# Patient Record
Sex: FEMALE | Race: WHITE | Employment: OTHER | URBAN - NONMETROPOLITAN AREA
[De-identification: names, ages, dates, MRNs, and addresses within clinical notes are randomized per-mention and may not be internally consistent; named-entity substitution may affect disease eponyms.]

---

## 2019-04-29 ENCOUNTER — HOSPITAL ENCOUNTER (EMERGENCY)
Age: 69
Discharge: HOME | End: 2019-04-29
Attending: FAMILY MEDICINE
Payer: COMMERCIAL

## 2019-04-29 VITALS
WEIGHT: 180 LBS | OXYGEN SATURATION: 98 % | HEIGHT: 66 IN | DIASTOLIC BLOOD PRESSURE: 73 MMHG | TEMPERATURE: 98.6 F | SYSTOLIC BLOOD PRESSURE: 157 MMHG | RESPIRATION RATE: 18 BRPM | BODY MASS INDEX: 28.93 KG/M2 | HEART RATE: 81 BPM

## 2019-04-29 DIAGNOSIS — R11.2 NAUSEA VOMITING AND DIARRHEA: Primary | ICD-10-CM

## 2019-04-29 DIAGNOSIS — R19.7 NAUSEA VOMITING AND DIARRHEA: Primary | ICD-10-CM

## 2019-04-29 DIAGNOSIS — I10 ESSENTIAL HYPERTENSION: ICD-10-CM

## 2019-04-29 DIAGNOSIS — E87.6 HYPOKALEMIA: ICD-10-CM

## 2019-04-29 LAB
ANION GAP: 13 MEQ/L (ref 8–16)
BASOPHILS # BLD: 0.4 % (ref 0–3)
BUN BLDV-MCNC: 12 MG/DL (ref 7–18)
CHLORIDE BLD-SCNC: 98 MEQ/L (ref 98–107)
CO2: 23 MEQ/L (ref 21–32)
CREAT SERPL-MCNC: 0.7 MG/DL (ref 0.6–1.3)
EOSINOPHILS RELATIVE PERCENT: 1.4 % (ref 0–4)
GFR, ESTIMATED: 88 ML/MIN/1.73M2
GLUCOSE BLD-MCNC: 108 MG/DL (ref 74–106)
HCT VFR BLD CALC: 46.7 % (ref 37–47)
HEMOGLOBIN: 15.7 GM/DL (ref 12–16)
LYMPHOCYTES # BLD: 25 % (ref 15–47)
MCH RBC QN AUTO: 31.2 PG (ref 27–31)
MCHC RBC AUTO-ENTMCNC: 33.6 GM/DL (ref 33–37)
MCV RBC AUTO: 92.7 FL (ref 81–99)
MONOCYTES: 9.6 % (ref 0–12)
PDW BLD-RTO: 12.4 % (ref 11.5–14.5)
PLATELET # BLD: 305 THOU/MM3 (ref 130–400)
PMV BLD AUTO: 8.3 FL (ref 7.4–10.4)
POC CALCIUM: 10.3 MG/DL (ref 8.5–10.1)
POTASSIUM SERPL-SCNC: 3.3 MEQ/L (ref 3.5–5.1)
RBC # BLD: 5.04 MILL/MM3 (ref 4.2–5.4)
SEGS: 63.6 % (ref 43–75)
SODIUM BLD-SCNC: 134 MEQ/L (ref 136–145)
WBC # BLD: 6.9 THOU/MM3 (ref 4.8–10.8)

## 2019-04-29 PROCEDURE — 85025 COMPLETE CBC W/AUTO DIFF WBC: CPT

## 2019-04-29 PROCEDURE — 6360000002 HC RX W HCPCS: Performed by: FAMILY MEDICINE

## 2019-04-29 PROCEDURE — 2709999900 HC NON-CHARGEABLE SUPPLY

## 2019-04-29 PROCEDURE — 6370000000 HC RX 637 (ALT 250 FOR IP): Performed by: FAMILY MEDICINE

## 2019-04-29 PROCEDURE — 36415 COLL VENOUS BLD VENIPUNCTURE: CPT

## 2019-04-29 PROCEDURE — 96374 THER/PROPH/DIAG INJ IV PUSH: CPT

## 2019-04-29 PROCEDURE — 2580000003 HC RX 258: Performed by: FAMILY MEDICINE

## 2019-04-29 PROCEDURE — 80048 BASIC METABOLIC PNL TOTAL CA: CPT

## 2019-04-29 PROCEDURE — 99284 EMERGENCY DEPT VISIT MOD MDM: CPT

## 2019-04-29 RX ORDER — AMLODIPINE BESYLATE 10 MG/1
10 TABLET ORAL DAILY
COMMUNITY
End: 2022-08-01 | Stop reason: SDUPTHER

## 2019-04-29 RX ORDER — POTASSIUM CHLORIDE 750 MG/1
40 TABLET, FILM COATED, EXTENDED RELEASE ORAL ONCE
Status: COMPLETED | OUTPATIENT
Start: 2019-04-29 | End: 2019-04-29

## 2019-04-29 RX ORDER — 0.9 % SODIUM CHLORIDE 0.9 %
1000 INTRAVENOUS SOLUTION INTRAVENOUS ONCE
Status: COMPLETED | OUTPATIENT
Start: 2019-04-29 | End: 2019-04-29

## 2019-04-29 RX ORDER — LISINOPRIL 40 MG/1
40 TABLET ORAL DAILY
COMMUNITY
End: 2022-08-01

## 2019-04-29 RX ORDER — ONDANSETRON 2 MG/ML
4 INJECTION INTRAMUSCULAR; INTRAVENOUS ONCE
Status: COMPLETED | OUTPATIENT
Start: 2019-04-29 | End: 2019-04-29

## 2019-04-29 RX ADMIN — ONDANSETRON 4 MG: 2 INJECTION INTRAMUSCULAR; INTRAVENOUS at 10:00

## 2019-04-29 RX ADMIN — SODIUM CHLORIDE 1000 ML: 9 INJECTION, SOLUTION INTRAVENOUS at 10:00

## 2019-04-29 RX ADMIN — POTASSIUM CHLORIDE 40 MEQ: 750 TABLET, EXTENDED RELEASE ORAL at 11:02

## 2019-04-29 ASSESSMENT — ENCOUNTER SYMPTOMS
COUGH: 1
ABDOMINAL PAIN: 0
EYE DISCHARGE: 0
NAUSEA: 1
EYE REDNESS: 0
RHINORRHEA: 0
SHORTNESS OF BREATH: 0
DIARRHEA: 1
VOMITING: 0

## 2019-04-29 NOTE — ED NOTES
Discharge instructions reviewed with patient and questions answered. Skin warm and dry, color normal for ethnicity. No emesis or diarrhea while at [facility]. Remains alert and cooperative. Denies further questions or concerns at this time.      Stanley Fox RN  04/29/19 1790

## 2019-04-29 NOTE — ED PROVIDER NOTES
antibiotics. FAMILY HISTORY     has no family status information on file. family history is not on file. SOCIAL HISTORY      reports that she has never smoked. She has never used smokeless tobacco.    PHYSICAL EXAM     INITIAL VITALS:  height is 5' 6\" (1.676 m) and weight is 180 lb (81.6 kg). Her oral temperature is 98.6 °F (37 °C). Her blood pressure is 157/73 (abnormal) and her pulse is 81. Her respiration is 18 and oxygen saturation is 98%. Physical Exam   Constitutional: She is oriented to person, place, and time. She appears well-developed and well-nourished. No distress. HENT:   Head: Normocephalic and atraumatic. Dry oral mucosa   Eyes: Pupils are equal, round, and reactive to light. Conjunctivae and EOM are normal. Right eye exhibits no discharge. Left eye exhibits no discharge. Cardiovascular: Normal rate, regular rhythm, normal heart sounds and intact distal pulses. No murmur heard. Pulmonary/Chest: Effort normal and breath sounds normal. She has no wheezes. She exhibits no tenderness. Abdominal: Soft. She exhibits no distension. There is no tenderness. There is no guarding. Increased bowel sounds   Musculoskeletal: She exhibits no edema. Neurological: She is alert and oriented to person, place, and time. No cranial nerve deficit. Skin: Skin is warm and dry. No rash noted. Psychiatric: She has a normal mood and affect. Her behavior is normal.   Nursing note and vitals reviewed. DIFFERENTIAL DIAGNOSIS:   Gastroenteritis, C.  Diff    DIAGNOSTIC RESULTS       LABS:   Labs Reviewed   CBC WITH AUTO DIFFERENTIAL - Abnormal; Notable for the following components:       Result Value    MCH 31.2 (*)     All other components within normal limits   BASIC METABOLIC PANEL - Abnormal; Notable for the following components:    Sodium 134 (*)     Potassium 3.3 (*)     Glucose 108 (*)     POC CALCIUM 10.3 (*)     All other components within normal limits   GLOMERULAR FILTRATION RATE, ESTIMATED - Abnormal; Notable for the following components:    GFR, Estimated 88 (*)     All other components within normal limits   ANION GAP       EMERGENCY DEPARTMENT COURSE:   Vitals:    Vitals:    04/29/19 0915 04/29/19 1104   BP: (!) 156/80 (!) 157/73   Pulse: 89 81   Resp: 20 18   Temp: 98.6 °F (37 °C)    TempSrc: Oral    SpO2: 97% 98%   Weight: 180 lb (81.6 kg)    Height: 5' 6\" (1.676 m)      MDM  Patient seen and evaluated in the department for nausea and diarrhea. Appropriate labs were ordered and reviewed. She was unable to produce a stool sample for testing. Patient was treated in the department with Zofran, Klor-Con, and 0.9% NS IVF bolus. Patient's blood pressure was noted to be elevated within the department. Patient has diagnosis of hypertension and did not take her antihypertensive medications today. I considered discharge to be an appropriate decision based on the patient's condition. Patient was discharged home in stable condition with recommended follow up with their PCP. CRITICAL CARE:   None    CONSULTS:  None    PROCEDURES:  None    FINAL IMPRESSION      1. Nausea vomiting and diarrhea    2. Hypokalemia    3.  Essential hypertension          DISPOSITION/PLAN   Discharge    PATIENT REFERRED TO:  Jessica Hylton MD  [address] [place] [address]  [phone]    Schedule an appointment as soon as possible for a visit in 3 days  diarrhea and hypertension      DISCHARGEMEDICATIONS:  New Prescriptions    No medications on file       (Please note that portions of this note were completedwith a voice recognition program.  Efforts were made to edit the dictations but occasionally words are mis-transcribed.)    MD Jesica Morrissey MD  04/29/19 1119

## 2019-10-23 ENCOUNTER — HOSPITAL ENCOUNTER (OUTPATIENT)
Dept: PHYSICAL THERAPY | Age: 69
Setting detail: THERAPIES SERIES
Discharge: HOME | End: 2019-10-23
Payer: COMMERCIAL

## 2019-10-23 PROCEDURE — 97035 APP MDLTY 1+ULTRASOUND EA 15: CPT

## 2019-10-23 PROCEDURE — 97161 PT EVAL LOW COMPLEX 20 MIN: CPT

## 2019-10-23 ASSESSMENT — PAIN DESCRIPTION - LOCATION: LOCATION: KNEE

## 2019-10-23 ASSESSMENT — PAIN DESCRIPTION - ORIENTATION: ORIENTATION: RIGHT;LEFT

## 2019-10-23 ASSESSMENT — PAIN SCALES - GENERAL: PAINLEVEL_OUTOF10: 0

## 2019-10-23 ASSESSMENT — PAIN DESCRIPTION - PAIN TYPE: TYPE: CHRONIC PAIN

## 2019-10-25 ENCOUNTER — HOSPITAL ENCOUNTER (OUTPATIENT)
Dept: PHYSICAL THERAPY | Age: 69
Setting detail: THERAPIES SERIES
Discharge: HOME | End: 2019-10-25
Payer: COMMERCIAL

## 2019-10-25 PROCEDURE — 97035 APP MDLTY 1+ULTRASOUND EA 15: CPT

## 2019-10-25 PROCEDURE — 97530 THERAPEUTIC ACTIVITIES: CPT

## 2019-10-25 ASSESSMENT — PAIN SCALES - GENERAL: PAINLEVEL_OUTOF10: 1

## 2019-10-25 ASSESSMENT — PAIN DESCRIPTION - ORIENTATION: ORIENTATION: LEFT

## 2019-10-25 ASSESSMENT — PAIN DESCRIPTION - LOCATION: LOCATION: LEG

## 2019-10-25 ASSESSMENT — PAIN DESCRIPTION - PAIN TYPE: TYPE: CHRONIC PAIN

## 2019-10-29 ENCOUNTER — HOSPITAL ENCOUNTER (OUTPATIENT)
Dept: PHYSICAL THERAPY | Age: 69
Setting detail: THERAPIES SERIES
Discharge: HOME | End: 2019-10-29
Payer: COMMERCIAL

## 2019-10-29 PROCEDURE — 97035 APP MDLTY 1+ULTRASOUND EA 15: CPT

## 2019-10-29 PROCEDURE — 97110 THERAPEUTIC EXERCISES: CPT

## 2019-10-29 ASSESSMENT — PAIN SCALES - GENERAL: PAINLEVEL_OUTOF10: 4

## 2019-11-01 ENCOUNTER — HOSPITAL ENCOUNTER (OUTPATIENT)
Dept: PHYSICAL THERAPY | Age: 69
Setting detail: THERAPIES SERIES
Discharge: HOME | End: 2019-11-01
Payer: COMMERCIAL

## 2019-11-01 PROCEDURE — 97530 THERAPEUTIC ACTIVITIES: CPT

## 2019-11-01 PROCEDURE — 97035 APP MDLTY 1+ULTRASOUND EA 15: CPT

## 2019-11-01 ASSESSMENT — PAIN DESCRIPTION - LOCATION: LOCATION: KNEE

## 2019-11-01 ASSESSMENT — PAIN DESCRIPTION - ORIENTATION: ORIENTATION: RIGHT;LEFT

## 2019-11-01 ASSESSMENT — PAIN SCALES - GENERAL: PAINLEVEL_OUTOF10: 2

## 2019-11-06 ENCOUNTER — HOSPITAL ENCOUNTER (OUTPATIENT)
Dept: PHYSICAL THERAPY | Age: 69
Setting detail: THERAPIES SERIES
Discharge: HOME | End: 2019-11-06
Payer: COMMERCIAL

## 2019-11-06 PROCEDURE — 97035 APP MDLTY 1+ULTRASOUND EA 15: CPT

## 2019-11-06 PROCEDURE — 97530 THERAPEUTIC ACTIVITIES: CPT

## 2019-11-06 ASSESSMENT — PAIN DESCRIPTION - LOCATION: LOCATION: KNEE

## 2019-11-06 ASSESSMENT — PAIN SCALES - GENERAL: PAINLEVEL_OUTOF10: 1

## 2019-11-06 ASSESSMENT — PAIN DESCRIPTION - ORIENTATION: ORIENTATION: LEFT

## 2019-11-06 ASSESSMENT — PAIN DESCRIPTION - PAIN TYPE: TYPE: CHRONIC PAIN

## 2019-11-08 ENCOUNTER — HOSPITAL ENCOUNTER (OUTPATIENT)
Dept: PHYSICAL THERAPY | Age: 69
Setting detail: THERAPIES SERIES
Discharge: HOME | End: 2019-11-08
Payer: COMMERCIAL

## 2019-11-08 PROCEDURE — 97530 THERAPEUTIC ACTIVITIES: CPT

## 2019-11-08 PROCEDURE — 97035 APP MDLTY 1+ULTRASOUND EA 15: CPT

## 2019-11-08 ASSESSMENT — PAIN DESCRIPTION - ORIENTATION: ORIENTATION: LEFT

## 2019-11-08 ASSESSMENT — PAIN DESCRIPTION - DIRECTION: RADIATING_TOWARDS: POSTERIOR L KNEE

## 2019-11-08 ASSESSMENT — PAIN DESCRIPTION - PAIN TYPE: TYPE: CHRONIC PAIN

## 2019-11-08 ASSESSMENT — PAIN SCALES - GENERAL: PAINLEVEL_OUTOF10: 1

## 2019-11-08 ASSESSMENT — PAIN DESCRIPTION - LOCATION: LOCATION: KNEE

## 2019-11-13 ENCOUNTER — HOSPITAL ENCOUNTER (OUTPATIENT)
Dept: PHYSICAL THERAPY | Age: 69
Setting detail: THERAPIES SERIES
Discharge: HOME | End: 2019-11-13
Payer: COMMERCIAL

## 2019-11-13 PROCEDURE — 97035 APP MDLTY 1+ULTRASOUND EA 15: CPT

## 2019-11-13 PROCEDURE — 97530 THERAPEUTIC ACTIVITIES: CPT

## 2019-11-15 ENCOUNTER — HOSPITAL ENCOUNTER (OUTPATIENT)
Dept: PHYSICAL THERAPY | Age: 69
Setting detail: THERAPIES SERIES
Discharge: HOME | End: 2019-11-15
Payer: COMMERCIAL

## 2019-11-15 PROCEDURE — 97530 THERAPEUTIC ACTIVITIES: CPT

## 2019-11-15 PROCEDURE — 97035 APP MDLTY 1+ULTRASOUND EA 15: CPT

## 2019-11-15 ASSESSMENT — PAIN DESCRIPTION - PAIN TYPE: TYPE: CHRONIC PAIN

## 2019-11-15 ASSESSMENT — PAIN DESCRIPTION - LOCATION: LOCATION: KNEE

## 2019-11-15 ASSESSMENT — PAIN DESCRIPTION - ORIENTATION: ORIENTATION: LEFT

## 2019-11-15 ASSESSMENT — PAIN SCALES - GENERAL: PAINLEVEL_OUTOF10: 2

## 2019-11-15 ASSESSMENT — PAIN DESCRIPTION - DIRECTION: RADIATING_TOWARDS: POSTERIOR L KNEE

## 2019-11-18 ENCOUNTER — HOSPITAL ENCOUNTER (OUTPATIENT)
Dept: PHYSICAL THERAPY | Age: 69
Setting detail: THERAPIES SERIES
Discharge: HOME | End: 2019-11-18
Payer: COMMERCIAL

## 2019-11-18 PROCEDURE — 97035 APP MDLTY 1+ULTRASOUND EA 15: CPT

## 2019-11-18 PROCEDURE — 97530 THERAPEUTIC ACTIVITIES: CPT

## 2019-11-18 ASSESSMENT — PAIN DESCRIPTION - PAIN TYPE: TYPE: CHRONIC PAIN

## 2019-11-18 ASSESSMENT — PAIN DESCRIPTION - DIRECTION: RADIATING_TOWARDS: POSTERIOR L KNEE

## 2019-11-18 ASSESSMENT — PAIN DESCRIPTION - ORIENTATION: ORIENTATION: LEFT

## 2019-11-18 ASSESSMENT — PAIN DESCRIPTION - LOCATION: LOCATION: KNEE

## 2019-11-18 ASSESSMENT — PAIN SCALES - GENERAL: PAINLEVEL_OUTOF10: 1

## 2019-11-22 ENCOUNTER — HOSPITAL ENCOUNTER (OUTPATIENT)
Dept: PHYSICAL THERAPY | Age: 69
Setting detail: THERAPIES SERIES
Discharge: HOME | End: 2019-11-22
Payer: COMMERCIAL

## 2019-11-22 PROCEDURE — 97530 THERAPEUTIC ACTIVITIES: CPT

## 2019-11-22 PROCEDURE — 97035 APP MDLTY 1+ULTRASOUND EA 15: CPT

## 2019-11-25 ENCOUNTER — HOSPITAL ENCOUNTER (OUTPATIENT)
Dept: PHYSICAL THERAPY | Age: 69
Setting detail: THERAPIES SERIES
Discharge: HOME | End: 2019-11-25
Payer: COMMERCIAL

## 2019-11-25 PROCEDURE — 97530 THERAPEUTIC ACTIVITIES: CPT

## 2019-11-25 PROCEDURE — 97035 APP MDLTY 1+ULTRASOUND EA 15: CPT

## 2022-07-29 SDOH — HEALTH STABILITY: PHYSICAL HEALTH: ON AVERAGE, HOW MANY MINUTES DO YOU ENGAGE IN EXERCISE AT THIS LEVEL?: 30 MIN

## 2022-07-29 SDOH — HEALTH STABILITY: PHYSICAL HEALTH: ON AVERAGE, HOW MANY DAYS PER WEEK DO YOU ENGAGE IN MODERATE TO STRENUOUS EXERCISE (LIKE A BRISK WALK)?: 2 DAYS

## 2022-08-01 ENCOUNTER — OFFICE VISIT (OUTPATIENT)
Dept: FAMILY MEDICINE CLINIC | Age: 72
End: 2022-08-01
Payer: COMMERCIAL

## 2022-08-01 VITALS
RESPIRATION RATE: 18 BRPM | HEIGHT: 66 IN | BODY MASS INDEX: 29.25 KG/M2 | SYSTOLIC BLOOD PRESSURE: 136 MMHG | WEIGHT: 182 LBS | OXYGEN SATURATION: 96 % | HEART RATE: 77 BPM | DIASTOLIC BLOOD PRESSURE: 66 MMHG

## 2022-08-01 DIAGNOSIS — Z11.59 NEED FOR HEPATITIS C SCREENING TEST: ICD-10-CM

## 2022-08-01 DIAGNOSIS — Z01.419 WELL WOMAN EXAM: ICD-10-CM

## 2022-08-01 DIAGNOSIS — M79.89 LEG SWELLING: ICD-10-CM

## 2022-08-01 DIAGNOSIS — E21.3 HYPERPARATHYROIDISM (HCC): ICD-10-CM

## 2022-08-01 DIAGNOSIS — I10 PRIMARY HYPERTENSION: Primary | ICD-10-CM

## 2022-08-01 DIAGNOSIS — E78.5 HYPERLIPIDEMIA, UNSPECIFIED HYPERLIPIDEMIA TYPE: ICD-10-CM

## 2022-08-01 PROBLEM — T78.40XA ALLERGIES: Status: ACTIVE | Noted: 2022-08-01

## 2022-08-01 PROBLEM — C80.1 CANCER (HCC): Status: ACTIVE | Noted: 2022-08-01

## 2022-08-01 PROCEDURE — 99204 OFFICE O/P NEW MOD 45 MIN: CPT | Performed by: FAMILY MEDICINE

## 2022-08-01 PROCEDURE — 1123F ACP DISCUSS/DSCN MKR DOCD: CPT | Performed by: FAMILY MEDICINE

## 2022-08-01 RX ORDER — SIMVASTATIN 20 MG
20 TABLET ORAL NIGHTLY
Qty: 90 TABLET | Refills: 1 | Status: SHIPPED | OUTPATIENT
Start: 2022-08-01 | End: 2022-10-20 | Stop reason: SDUPTHER

## 2022-08-01 RX ORDER — SIMVASTATIN 20 MG
TABLET ORAL
COMMUNITY
Start: 2022-07-18 | End: 2022-08-01 | Stop reason: SDUPTHER

## 2022-08-01 RX ORDER — AMLODIPINE BESYLATE 10 MG/1
10 TABLET ORAL DAILY
Qty: 90 TABLET | Refills: 1 | Status: SHIPPED | OUTPATIENT
Start: 2022-08-01 | End: 2022-10-20 | Stop reason: SDUPTHER

## 2022-08-01 RX ORDER — LOSARTAN POTASSIUM 100 MG/1
100 TABLET ORAL DAILY
Qty: 90 TABLET | Refills: 1 | Status: SHIPPED | OUTPATIENT
Start: 2022-08-01 | End: 2022-10-20 | Stop reason: SDUPTHER

## 2022-08-01 RX ORDER — METOPROLOL SUCCINATE 100 MG/1
100 TABLET, EXTENDED RELEASE ORAL DAILY
Qty: 135 TABLET | Refills: 1 | Status: SHIPPED | OUTPATIENT
Start: 2022-08-01 | End: 2022-08-01 | Stop reason: SDUPTHER

## 2022-08-01 RX ORDER — FLUTICASONE PROPIONATE 50 MCG
2 SPRAY, SUSPENSION (ML) NASAL DAILY
COMMUNITY

## 2022-08-01 RX ORDER — METOPROLOL SUCCINATE 100 MG/1
150 TABLET, EXTENDED RELEASE ORAL DAILY
Qty: 135 TABLET | Refills: 1 | Status: SHIPPED | OUTPATIENT
Start: 2022-08-01 | End: 2022-10-20

## 2022-08-01 RX ORDER — LOSARTAN POTASSIUM 100 MG/1
TABLET ORAL
COMMUNITY
Start: 2022-05-30 | End: 2022-08-01 | Stop reason: SDUPTHER

## 2022-08-01 RX ORDER — METOPROLOL SUCCINATE 100 MG/1
TABLET, EXTENDED RELEASE ORAL
COMMUNITY
Start: 2022-05-18 | End: 2022-08-01 | Stop reason: SDUPTHER

## 2022-08-01 ASSESSMENT — ENCOUNTER SYMPTOMS
EYE REDNESS: 0
CONSTIPATION: 0
COUGH: 0
ABDOMINAL PAIN: 0
NAUSEA: 0
SHORTNESS OF BREATH: 0
DIARRHEA: 0
VOMITING: 0
EYE DISCHARGE: 0
SORE THROAT: 0
BACK PAIN: 0

## 2022-08-01 ASSESSMENT — PATIENT HEALTH QUESTIONNAIRE - PHQ9
SUM OF ALL RESPONSES TO PHQ QUESTIONS 1-9: 0
2. FEELING DOWN, DEPRESSED OR HOPELESS: 0
SUM OF ALL RESPONSES TO PHQ9 QUESTIONS 1 & 2: 0
SUM OF ALL RESPONSES TO PHQ QUESTIONS 1-9: 0
1. LITTLE INTEREST OR PLEASURE IN DOING THINGS: 0

## 2022-08-01 NOTE — PROGRESS NOTES
[address] [address]  Dept: [phone]  Dept Fax: [phone]  Loc: [phone]      Raine Castellanos is a 74 y.o. female who presents todayfor her medical conditions/complaints as noted below. Raine Castellanos is c/o of Established New Doctor      :     SANDY Mora is usual doctor. Establishing with me now. Blood work showed high calcium and was diagnosed with hyperparathyroidism. Following with endocrinology. Not in treatment except for vitamin D at this time. Cancer history is remote and in remission. Hypertension controlled on Norvasc, Losartan and Metoprolol. High cholesterol controlled on Simvastatin. Did have colonoscopy at age 69. 8 year repeat needed. Did have COVID-19 in July. Mild bilateral leg swelling. Did have an EKG that was abnormal in the past. Had an abnormal stress test. Was given a choice between echocardiogram or catherization. Catherization was normal (one artery 25% blocked). Patient Active Problem List   Diagnosis    Allergies    Cancer ([address]    Hyperlipidemia    Hyperparathyroidism ([address]    Hypertension      Goals    None       The patient is allergic to sulfa antibiotics. Medical History  Leobardo Orantes has a past medical history of Allergies, Cancer ([address] Hyperlipidemia, Hyperparathyroidism ([address] Hypertension, and Osteopenia of multiple sites. Past SurgicalHistory  The patient  has a past surgical history that includes Breast surgery (Left); Hysterectomy, vaginal; and LEEP. Family History  This patient's family history includes Alzheimer's Disease in her mother; Breast Cancer in her sister; Cancer in her father and paternal grandfather; Cancer (age of onset: 40) in her brother; Diabetes in her mother; High Blood Pressure in her father; High Cholesterol in her father; Stroke in her brother.     Social History  Leobardo Orantes  reports that she has never smoked. She has never used smokeless tobacco. She reports that she does not use drugs. Medications    Current Outpatient Medications:     Cholecalciferol (VITAMIN D3) 125 MCG (5000 UT) TABS, Take by mouth daily, Disp: , Rfl:     Melatonin 5 MG CAPS, Take by mouth nightly as needed, Disp: , Rfl:     fluticasone (FLONASE) 50 MCG/ACT nasal spray, 2 sprays by Each Nostril route daily, Disp: , Rfl:     guaiFENesin (MUCUS RELIEF ADULT PO), Take 1,200 mg by mouth in the morning and at bedtime, Disp: , Rfl:     metoprolol succinate (TOPROL XL) 100 MG extended release tablet, Take 1 tablet by mouth in the morning., Disp: 135 tablet, Rfl: 1    simvastatin (ZOCOR) 20 MG tablet, Take 1 tablet by mouth nightly, Disp: 90 tablet, Rfl: 1    losartan (COZAAR) 100 MG tablet, Take 1 tablet by mouth in the morning., Disp: 90 tablet, Rfl: 1    amLODIPine (NORVASC) 10 MG tablet, Take 1 tablet by mouth in the morning., Disp: 90 tablet, Rfl: 1    Subjective:      Review of Systems   Constitutional:  Negative for chills, fatigue, fever and unexpected weight change. HENT:  Negative for congestion, ear discharge, ear pain, hearing loss and sore throat. Eyes:  Negative for discharge and redness. Respiratory:  Negative for cough and shortness of breath. Cardiovascular:  Negative for chest pain and palpitations. Gastrointestinal:  Negative for abdominal pain, constipation, diarrhea, nausea and vomiting. Genitourinary:  Negative for difficulty urinating and dysuria. Musculoskeletal:  Negative for arthralgias, back pain, gait problem and neck pain. Skin:  Negative for rash. Allergic/Immunologic: Negative for environmental allergies. Neurological:  Negative for headaches. Psychiatric/Behavioral:  Positive for sleep disturbance (chronic; takes melatonin with relief). Negative for dysphoric mood. The patient is not nervous/anxious.       Objective:     Vitals:    08/01/22 1543   BP: 136/66   Site: Right Upper Arm   Position: Sitting   Pulse: 77   Resp: 18   SpO2: 96%   Weight: 182 lb (82.6 kg)   Height: 5' 5.5\" (1.664 m)       Physical Exam  Vitals reviewed. Constitutional:       General: She is not in acute distress. Appearance: She is well-developed. She is not ill-appearing or toxic-appearing. HENT:      Head: Normocephalic and atraumatic. Eyes:      Conjunctiva/sclera: Conjunctivae normal.   Neck:      Thyroid: No thyroid mass, thyromegaly or thyroid tenderness. Cardiovascular:      Rate and Rhythm: Normal rate and regular rhythm. Heart sounds: Normal heart sounds. No murmur heard. Pulmonary:      Effort: Pulmonary effort is normal. No respiratory distress. Breath sounds: Normal breath sounds. No stridor. No wheezing, rhonchi or rales. Abdominal:      Palpations: Abdomen is soft. Tenderness: There is no abdominal tenderness. Musculoskeletal:      Cervical back: Neck supple. Right lower leg: Edema present. Left lower leg: Edema present. Comments: Trace pitting distally. Symmetric bilaterally. Skin:     General: Skin is warm and dry. Comments: No obvious rash. Neurological:      Mental Status: She is alert. Comments: No obvious focal deficit. Psychiatric:         Mood and Affect: Mood normal.         Behavior: Behavior normal.         Thought Content: Thought content normal.         Judgment: Judgment normal.       Lab Results   Component Value Date    WBC 6.9 04/29/2019    HGB 15.7 04/29/2019    HCT 46.7 04/29/2019     04/29/2019     (L) 04/29/2019    K 3.3 (L) 04/29/2019    CL 98 04/29/2019    CREATININE 0.7 04/29/2019    BUN 12 04/29/2019    CO2 23 04/29/2019       /Plan:   1. Primary hypertension  At goal. Refilled regimen. Checking labs. - metoprolol succinate (TOPROL XL) 100 MG extended release tablet; Take 1 tablet by mouth in the morning. Dispense: 135 tablet; Refill: 1  - losartan (COZAAR) 100 MG tablet; Take 1 tablet by mouth in the morning. Expiration Date:   8/1/2023    Vitamin D 25 Hydroxy     Standing Status:   Future     Standing Expiration Date:   8/1/2023    PTH, Intact     Standing Status:   Future     Standing Expiration Date:   8/1/2023    Calcium, Ionized     Standing Status:   Future     Standing Expiration Date:   8/1/2023    Echocardiogram complete     Standing Status:   Future     Standing Expiration Date:   9/30/2022     Scheduling Instructions:      Prefers [facility] if possible. If not [facility]. Order Specific Question:   Reason for exam:     Answer:   leg swelling       Prescriptions given/sent  Orders Placed This Encounter   Medications    metoprolol succinate (TOPROL XL) 100 MG extended release tablet     Sig: Take 1 tablet by mouth in the morning. Dispense:  135 tablet     Refill:  1    simvastatin (ZOCOR) 20 MG tablet     Sig: Take 1 tablet by mouth nightly     Dispense:  90 tablet     Refill:  1    losartan (COZAAR) 100 MG tablet     Sig: Take 1 tablet by mouth in the morning. Dispense:  90 tablet     Refill:  1    amLODIPine (NORVASC) 10 MG tablet     Sig: Take 1 tablet by mouth in the morning. Dispense:  90 tablet     Refill:  1       Patient given educational materials - see patient instructions. Discussed use, benefit, and side effects of recommended medications. All patient questions answered. Pt voiced understanding. Reviewed health maintenance; will bring records of Dexa and Colonoscopy and return for well check.             Electronically signed by Maxime Lozada MD on 8/1/2022 at 4:46 PM

## 2022-08-09 LAB
ALBUMIN SERPL-MCNC: 4.4 G/DL
ALP BLD-CCNC: 140 U/L
ALT SERPL-CCNC: 12 U/L
ANION GAP SERPL CALCULATED.3IONS-SCNC: 1.5 MMOL/L
AST SERPL-CCNC: 16 U/L
BILIRUB SERPL-MCNC: 0.7 MG/DL (ref 0.1–1.4)
BUN BLDV-MCNC: 12 MG/DL
CALCIUM SERPL-MCNC: 108 MG/DL
CHLORIDE BLD-SCNC: 102 MMOL/L
CO2: 27 MMOL/L
CREAT SERPL-MCNC: 0.7 MG/DL
GFR CALCULATED: 60
GLUCOSE BLD-MCNC: 98 MG/DL
PHOSPHORUS: 3.3 MG/DL
POTASSIUM SERPL-SCNC: 4.6 MMOL/L
PTH INTACT: 77
SODIUM BLD-SCNC: 135 MMOL/L
TOTAL PROTEIN: 7.4

## 2022-08-11 ENCOUNTER — HOSPITAL ENCOUNTER (OUTPATIENT)
Dept: NON INVASIVE DIAGNOSTICS | Age: 72
Discharge: HOME | End: 2022-08-11
Payer: COMMERCIAL

## 2022-08-11 DIAGNOSIS — M79.89 LEG SWELLING: ICD-10-CM

## 2022-08-11 LAB
LV EF: 63 %
LVEF MODALITY: NORMAL

## 2022-08-11 PROCEDURE — 93306 TTE W/DOPPLER COMPLETE: CPT

## 2022-08-17 ENCOUNTER — TELEPHONE (OUTPATIENT)
Dept: FAMILY MEDICINE CLINIC | Age: 72
End: 2022-08-17

## 2022-08-17 DIAGNOSIS — M79.89 LEG SWELLING: ICD-10-CM

## 2022-08-17 DIAGNOSIS — I35.0 AORTIC VALVE STENOSIS, ETIOLOGY OF CARDIAC VALVE DISEASE UNSPECIFIED: Primary | ICD-10-CM

## 2022-08-17 NOTE — TELEPHONE ENCOUNTER
----- Message from Maxime Lozada MD sent at 8/17/2022  8:26 AM EDT -----  Narrowing or stenosis of the aortic valve is seen. I do not believe that this is severe enough to need intervention at this time but have placed a referral to cardiology. If questions, have patient call my cell to discuss results. Thanks.

## 2022-09-08 ENCOUNTER — OFFICE VISIT (OUTPATIENT)
Dept: CARDIOLOGY CLINIC | Age: 72
End: 2022-09-08
Payer: COMMERCIAL

## 2022-09-08 VITALS
BODY MASS INDEX: 28.45 KG/M2 | DIASTOLIC BLOOD PRESSURE: 86 MMHG | WEIGHT: 177 LBS | SYSTOLIC BLOOD PRESSURE: 144 MMHG | HEIGHT: 66 IN | HEART RATE: 69 BPM

## 2022-09-08 DIAGNOSIS — I35.0 AORTIC VALVE STENOSIS, ETIOLOGY OF CARDIAC VALVE DISEASE UNSPECIFIED: Primary | ICD-10-CM

## 2022-09-08 PROCEDURE — 93000 ELECTROCARDIOGRAM COMPLETE: CPT | Performed by: INTERNAL MEDICINE

## 2022-09-08 PROCEDURE — 1123F ACP DISCUSS/DSCN MKR DOCD: CPT | Performed by: INTERNAL MEDICINE

## 2022-09-08 PROCEDURE — 99204 OFFICE O/P NEW MOD 45 MIN: CPT | Performed by: INTERNAL MEDICINE

## 2022-09-08 NOTE — PROGRESS NOTES
tobacco. She reports that she does not use drugs. Family History  Braulio Ayala family history includes Alzheimer's Disease in her mother; Breast Cancer in her sister; Cancer in her father and paternal grandfather; Cancer (age of onset: 40) in her brother; Diabetes in her mother; High Blood Pressure in her father; High Cholesterol in her father; Stroke in her brother. There is no family history of bicuspid aortic valve, aneurysms, heart transplant, pacemakers, defibrillators, or sudden cardiac death. Past Surgical History   Past Surgical History:   Procedure Laterality Date    BREAST SURGERY Left     HYSTERECTOMY, VAGINAL      LEEP         Review of Systems   Constitutional: Negative for chills and fever  HENT: Negative for congestion, sinus pressure, sneezing and sore throat. Eyes: Negative for pain, discharge, redness and itching. Respiratory: Negative for apnea, cough  Gastrointestinal: Negative for blood in stool, constipation, diarrhea   Endocrine: Negative for cold intolerance, heat intolerance, polydipsia. Genitourinary: Negative for dysuria, enuresis, flank pain and hematuria. Musculoskeletal: Negative for arthralgias, joint swelling and neck pain. Neurological: Negative for numbness and headaches. Psychiatric/Behavioral: Negative for agitation, confusion, decreased concentration and dysphoric mood. Objective:     BP (!) 144/86   Pulse 69   Ht 5' 5.5\" (1.664 m)   Wt 177 lb (80.3 kg)   BMI 29.01 kg/m²     Wt Readings from Last 3 Encounters:   09/08/22 177 lb (80.3 kg)   08/01/22 182 lb (82.6 kg)   04/29/19 180 lb (81.6 kg)     BP Readings from Last 3 Encounters:   09/08/22 (!) 144/86   08/01/22 136/66   04/29/19 (!) 157/73       Nursing note and vitals reviewed. Physical Exam   Constitutional: Oriented to person, place, and time. Appears well-developed and well-nourished. HENT:   Head: Normocephalic and atraumatic.    Eyes: EOM are normal. Pupils are equal, round, and reactive to light.   Neck: Normal range of motion. Neck supple. No JVD present. Cardiovascular: Normal rate, regular rhythm, normal heart sounds and intact distal pulses. 12/6 GERARDO  Pulmonary/Chest: Effort normal and breath sounds normal. No respiratory distress. No wheezes. No rales. Abdominal: Soft. Bowel sounds are normal. No distension. There is no tenderness. Musculoskeletal: Normal range of motion. 1+ edema. Neurological: Alert and oriented to person, place, and time. No cranial nerve deficit. Coordination normal.   Skin: Skin is warm and dry. Varicose veins. Psychiatric: Normal mood and affect.        No results found for: CKTOTAL, CKMB, CKMBINDEX    Lab Results   Component Value Date/Time    WBC 6.9 04/29/2019 10:30 AM    RBC 5.04 04/29/2019 10:30 AM    HGB 15.7 04/29/2019 10:30 AM    HCT 46.7 04/29/2019 10:30 AM    MCV 92.7 04/29/2019 10:30 AM    MCH 31.2 04/29/2019 10:30 AM    MCHC 33.6 04/29/2019 10:30 AM    RDW 12.4 04/29/2019 10:30 AM     04/29/2019 10:30 AM    MPV 8.3 04/29/2019 10:30 AM       Lab Results   Component Value Date/Time     08/09/2022 12:00 AM    K 4.6 08/09/2022 12:00 AM     08/09/2022 12:00 AM    CO2 27 08/09/2022 12:00 AM    BUN 12 08/09/2022 12:00 AM    LABALBU 4.4 08/09/2022 12:00 AM    CREATININE 0.7 08/09/2022 12:00 AM    CALCIUM 108 08/09/2022 12:00 AM    LABGLOM 60 08/09/2022 12:00 AM    GLUCOSE 98 08/09/2022 12:00 AM       Lab Results   Component Value Date/Time    ALKPHOS 140 08/09/2022 12:00 AM    ALT 12 08/09/2022 12:00 AM    AST 16 08/09/2022 12:00 AM    BILITOT 0.7 08/09/2022 12:00 AM    LABALBU 4.4 08/09/2022 12:00 AM       No results found for: MG    No results found for: INR, PROTIME      No results found for: LABA1C    No results found for: TRIG, HDL, LDLCALC, LDLDIRECT, LABVLDL    No results found for: TSH      Testing Reviewed:      I have individually reviewed the cardiac test below:    ECHO: Results for orders placed during the hospital encounter of 08/11/22    Echocardiogram complete    Narrative  Transthoracic Echocardiography Report (TTE)    Demographics    [facility]      Gender              Female  Khalida Pate    MR #           [id number]        Race                    Ethnicity    Account #      [de-identified]        Room Number    Accession      [id number]        Date of Study       08/11/2022  Number    Date of Birth  1950       Referring Physician Adriana Gee MD    Age            74 year(s)       Sonographer         Marin Elizalde RDCS    Interpreting        Echo reader of the  Physician           week  Liz Cid MD    Procedure    Type of Study    TTE procedure:ECHOCARDIOGRAM COMPLETE 2D W DOPPLER W COLOR. Procedure Date  Date: 08/11/2022 Start: 09:41 AM    Study Location: Echo Lab  Technical Quality: Adequate visualization    Indications:Lower extremity edema. Additional Medical History:Hypertension, hyperlipidemia, history of COVID    Patient Status: Routine    Height: 65 inches Weight: 182 pounds BSA: 1.9 m^2 BMI: 30.29 kg/m^2    BP: 136/66 mmHg    Conclusions    Summary  Normal left ventricle size and systolic function. Ejection fraction was estimated at 60 to 65 %. There were no regional left ventricular wall motion abnormalities and wall  thickness was within normal limits. The left atrium is Mildly dilated. There is mild-to-moderate aortic stenosis with valve area of 1.4 sq cm. The maximum aortic valve gradient is 22 mmHg, the mean gradient is 13  mmHg, and the peak velocity is 2.3 m/s. Signature    ----------------------------------------------------------------  Electronically signed by Liz Cid MD (Interpreting  physician) on 08/11/2022 at 08:43 PM  ----------------------------------------------------------------    Findings    Mitral Valve  The mitral valve structure was normal with normal leaflet separation.   DOPPLER: The transmitral velocity was within the normal range with no  evidence for mitral stenosis. Mild mitral regurgitation is present. Moderate annular calcification. Aortic Valve  Aortic valve leaflets are Mildly calcified. Leaflets exhibited mildly  increased thickness and mildly reduced cuspal separation of the aortic  valve. No evidence of aortic valve regurgitation . There is  mild-to-moderate aortic stenosis with valve area of 1.4 sq cm. The maximum  aortic valve gradient is 22 mmHg, the mean gradient is 13 mmHg, and the  peak velocity is 2.3 m/s. Tricuspid Valve  The tricuspid valve structure was normal with normal leaflet separation. DOPPLER: There was no evidence of tricuspid stenosis. Mild tricuspid  regurgitation visualized. Pulmonic Valve  The pulmonic valve leaflets exhibited normal thickness, no calcification,  and normal cuspal separation. DOPPLER: The transpulmonic velocity was  within the normal range with no evidence for regurgitation. Left Atrium  The left atrium is Mildly dilated. Left Ventricle  Normal left ventricle size and systolic function. Ejection fraction was  estimated at 60 to 65 %. There were no regional left ventricular wall  motion abnormalities and wall thickness was within normal limits. Right Atrium  Right atrial size was normal.    Right Ventricle  The right ventricular size was normal with normal systolic function and  wall thickness. Pericardial Effusion  The pericardium was normal in appearance with no evidence of a pericardial  effusion. Pleural Effusion  No evidence of pleural effusion. Aorta / Great Vessels  -Aortic root dimension within normal limits.  -The Pulmonary artery is within normal limits. -IVC size is within normal limits with normal respiratory phasic changes.     M-Mode/2D Measurements & Calculations    LV Diastolic   LV Systolic Dimension:    AV Cusp Separation: 1.5 cmLA  Dimension: 4.5 2.9 cm                    Dimension: 4 cmAO Root Dimension:  cm             LV Volume Diastolic: 70.1 2.6 cmLA Area: 18.4 cm^2  LV FS:35.6 %   ml  LV PW          LV Volume Systolic: 21.5  Diastolic: 1   ml  cm             LV EDV/LV EDV Index: 72.1 RV Diastolic Dimension: 3 cm  Septum         ml/49 m^2LV ESV/LV ESV  Diastolic: 1   Index: 06.6 ml/17 m^2     LA/Aorta: 1.54  cm             EF Calculated: 65.2 %     Ascending Aorta: 2.7 cm  LA volume/Index: 48.3 ml /25m^2    LVOT: 1.9 cm    Doppler Measurements & Calculations    MV Peak E-Wave: 132 AV Peak Velocity: 222   LVOT Peak Velocity: 109 cm/s  cm/s                cm/s                    LVOT Mean Velocity: 63.5 cm/s  MV Peak A-Wave: 112 AV Peak Gradient: 19.71 LVOT Peak Gradient: 5 mmHgLVOT  cm/s                mmHg                    Mean Gradient: 2 mmHg  MV E/A Ratio: 1.18  AV Mean Velocity: 159  MV Peak Gradient:   cm/s                    TV Peak E-Wave: 54.3 cm/s  6.97 mmHg           AV Mean Gradient: 12    TV Peak A-Wave: 67.4 cm/s  MV Mean Gradient: 4 mmHg  mmHg                AV VTI: 50.7 cm         TV Peak Gradient: 1.18 mmHg  MV Mean Velocity:   AV Area                 TR Velocity:281 cm/s  90.8 cm/s           (Continuity):1.41 cm^2  TR Gradient:31.58 mmHg  MV Deceleration                             PV Peak Velocity: 86.1 cm/s  Time: 210 msec      LVOT VTI: 25.3 cm       PV Peak Gradient: 2.97 mmHg  MV P1/2t: 92 msec   AV P1/2t: 766 msec  MVA by PHT:2.39     IVRT: 63 msec  cm^2  MV Area  (continuity): 1.48  AV DVI (VTI): 0.5AV DVI  cm^2                (Vmax):0.49  MV E' Septal  Velocity: 6.6 cm/s  MV A' Septal  Velocity: 7.2 cm/s  MV E' Lateral  Velocity: 7 cm/s  MV A' Lateral  Velocity: 9 cm/s  E/E' septal: 20  E/E' lateral: 18.86  MR Velocity: 451  cm/s    [id number]  [id number]       Assessment/Plan   Mild AS  Preserved EF  Varicose Veins  LE edema related to varicosities  Non-obstructive CAD  HTN  No active cardiac issues, would continue surveillance, RF management, compression stockings. No loud murmur clincally. If worsening, then proceed with repeat TTE. For now follow with PCP yearly, and if clinical syndrome changes, will proceed with further work-up.     Disposition:  prn           Electronically signed by Johana Lazaro MD   9/8/2022 at 12:35 PM EDT

## 2022-10-12 ENCOUNTER — NURSE ONLY (OUTPATIENT)
Dept: LAB | Age: 72
End: 2022-10-12

## 2022-10-12 ENCOUNTER — HOSPITAL ENCOUNTER (OUTPATIENT)
Age: 72
End: 2022-10-12

## 2022-10-12 DIAGNOSIS — Z01.419 WELL WOMAN EXAM: ICD-10-CM

## 2022-10-12 DIAGNOSIS — E78.5 HYPERLIPIDEMIA, UNSPECIFIED HYPERLIPIDEMIA TYPE: ICD-10-CM

## 2022-10-12 DIAGNOSIS — Z11.59 NEED FOR HEPATITIS C SCREENING TEST: ICD-10-CM

## 2022-10-12 DIAGNOSIS — E21.3 HYPERPARATHYROIDISM (HCC): ICD-10-CM

## 2022-10-12 DIAGNOSIS — I10 PRIMARY HYPERTENSION: ICD-10-CM

## 2022-10-12 LAB
ALBUMIN SERPL-MCNC: 4.2 G/DL (ref 3.5–5.1)
ALP BLD-CCNC: 162 U/L (ref 38–126)
ALT SERPL-CCNC: 13 U/L (ref 11–66)
ANION GAP SERPL CALCULATED.3IONS-SCNC: 11 MEQ/L (ref 8–16)
AST SERPL-CCNC: 15 U/L (ref 5–40)
BASOPHILS # BLD: 0.5 %
BASOPHILS ABSOLUTE: 0 THOU/MM3 (ref 0–0.1)
BILIRUB SERPL-MCNC: 0.5 MG/DL (ref 0.3–1.2)
BUN BLDV-MCNC: 12 MG/DL (ref 7–22)
CALCIUM SERPL-MCNC: 10.6 MG/DL (ref 8.5–10.5)
CHLORIDE BLD-SCNC: 100 MEQ/L (ref 98–111)
CHOLESTEROL, FASTING: 172 MG/DL (ref 100–199)
CO2: 26 MEQ/L (ref 23–33)
CREAT SERPL-MCNC: 0.6 MG/DL (ref 0.4–1.2)
EOSINOPHIL # BLD: 2.6 %
EOSINOPHILS ABSOLUTE: 0.2 THOU/MM3 (ref 0–0.4)
ERYTHROCYTE [DISTWIDTH] IN BLOOD BY AUTOMATED COUNT: 12.2 % (ref 11.5–14.5)
ERYTHROCYTE [DISTWIDTH] IN BLOOD BY AUTOMATED COUNT: 42.6 FL (ref 35–45)
GFR SERPL CREATININE-BSD FRML MDRD: > 90 ML/MIN/1.73M2
GLUCOSE BLD-MCNC: 106 MG/DL (ref 70–108)
HCT VFR BLD CALC: 42.6 % (ref 37–47)
HDLC SERPL-MCNC: 50 MG/DL
HEMOGLOBIN: 13.8 GM/DL (ref 12–16)
HEPATITIS C ANTIBODY: NEGATIVE
IMMATURE GRANS (ABS): 0.01 THOU/MM3 (ref 0–0.07)
IMMATURE GRANULOCYTES: 0.2 %
LDL CHOLESTEROL CALCULATED: 98 MG/DL
LYMPHOCYTES # BLD: 29.5 %
LYMPHOCYTES ABSOLUTE: 1.8 THOU/MM3 (ref 1–4.8)
MCH RBC QN AUTO: 31.2 PG (ref 26–33)
MCHC RBC AUTO-ENTMCNC: 32.4 GM/DL (ref 32.2–35.5)
MCV RBC AUTO: 96.4 FL (ref 81–99)
MONOCYTES # BLD: 10.1 %
MONOCYTES ABSOLUTE: 0.6 THOU/MM3 (ref 0.4–1.3)
NUCLEATED RED BLOOD CELLS: 0 /100 WBC
PLATELET # BLD: 302 THOU/MM3 (ref 130–400)
PMV BLD AUTO: 10.1 FL (ref 9.4–12.4)
POTASSIUM SERPL-SCNC: 5.1 MEQ/L (ref 3.5–5.2)
PTH INTACT: 66 PG/ML (ref 15–65)
RBC # BLD: 4.42 MILL/MM3 (ref 4.2–5.4)
SEG NEUTROPHILS: 57.1 %
SEGMENTED NEUTROPHILS ABSOLUTE COUNT: 3.5 THOU/MM3 (ref 1.8–7.7)
SODIUM BLD-SCNC: 137 MEQ/L (ref 135–145)
TOTAL PROTEIN: 6.8 G/DL (ref 6.1–8)
TRIGLYCERIDE, FASTING: 118 MG/DL (ref 0–199)
TSH SERPL DL<=0.05 MIU/L-ACNC: 2.68 UIU/ML (ref 0.4–4.2)
VITAMIN D 25-HYDROXY: 74 NG/ML (ref 30–100)
WBC # BLD: 6.2 THOU/MM3 (ref 4.8–10.8)

## 2022-10-13 LAB
REASON FOR REJECTION: NORMAL
REJECTED TEST: NORMAL

## 2022-10-14 ENCOUNTER — TELEPHONE (OUTPATIENT)
Dept: FAMILY MEDICINE CLINIC | Age: 72
End: 2022-10-14

## 2022-10-14 NOTE — TELEPHONE ENCOUNTER
----- Message from Alexander Mcgee MD sent at 10/13/2022  8:55 AM EDT -----  Reassuring labs overall. Calcium, alkaline phosphatase, and PTH are barely outside normal range. Please follow-up with endocrinology. Make sure patient endocrinologist gets a copy of labs also. Thanks. Follow-up with me as planned also. No need to call unless no-shows.

## 2022-10-17 ENCOUNTER — NURSE ONLY (OUTPATIENT)
Dept: LAB | Age: 72
End: 2022-10-17

## 2022-10-17 SDOH — HEALTH STABILITY: PHYSICAL HEALTH: ON AVERAGE, HOW MANY MINUTES DO YOU ENGAGE IN EXERCISE AT THIS LEVEL?: 60 MIN

## 2022-10-17 SDOH — HEALTH STABILITY: PHYSICAL HEALTH: ON AVERAGE, HOW MANY DAYS PER WEEK DO YOU ENGAGE IN MODERATE TO STRENUOUS EXERCISE (LIKE A BRISK WALK)?: 2 DAYS

## 2022-10-17 ASSESSMENT — PATIENT HEALTH QUESTIONNAIRE - PHQ9
SUM OF ALL RESPONSES TO PHQ QUESTIONS 1-9: 0
SUM OF ALL RESPONSES TO PHQ9 QUESTIONS 1 & 2: 0
1. LITTLE INTEREST OR PLEASURE IN DOING THINGS: 0
SUM OF ALL RESPONSES TO PHQ QUESTIONS 1-9: 0
2. FEELING DOWN, DEPRESSED OR HOPELESS: 0

## 2022-10-17 ASSESSMENT — LIFESTYLE VARIABLES
HOW OFTEN DO YOU HAVE A DRINK CONTAINING ALCOHOL: 1
HOW OFTEN DO YOU HAVE SIX OR MORE DRINKS ON ONE OCCASION: 1
HOW MANY STANDARD DRINKS CONTAINING ALCOHOL DO YOU HAVE ON A TYPICAL DAY: PATIENT DOES NOT DRINK
HOW MANY STANDARD DRINKS CONTAINING ALCOHOL DO YOU HAVE ON A TYPICAL DAY: 0
HOW OFTEN DO YOU HAVE A DRINK CONTAINING ALCOHOL: NEVER

## 2022-10-20 ENCOUNTER — OFFICE VISIT (OUTPATIENT)
Dept: FAMILY MEDICINE CLINIC | Age: 72
End: 2022-10-20
Payer: COMMERCIAL

## 2022-10-20 ENCOUNTER — TELEPHONE (OUTPATIENT)
Dept: FAMILY MEDICINE CLINIC | Age: 72
End: 2022-10-20

## 2022-10-20 VITALS
RESPIRATION RATE: 18 BRPM | DIASTOLIC BLOOD PRESSURE: 70 MMHG | HEART RATE: 72 BPM | WEIGHT: 174 LBS | OXYGEN SATURATION: 95 % | BODY MASS INDEX: 27.31 KG/M2 | SYSTOLIC BLOOD PRESSURE: 150 MMHG | HEIGHT: 67 IN

## 2022-10-20 DIAGNOSIS — M79.89 LEG SWELLING: ICD-10-CM

## 2022-10-20 DIAGNOSIS — E78.5 HYPERLIPIDEMIA, UNSPECIFIED HYPERLIPIDEMIA TYPE: ICD-10-CM

## 2022-10-20 DIAGNOSIS — I35.0 AORTIC VALVE STENOSIS, ETIOLOGY OF CARDIAC VALVE DISEASE UNSPECIFIED: ICD-10-CM

## 2022-10-20 DIAGNOSIS — R21 SKIN RASH: ICD-10-CM

## 2022-10-20 DIAGNOSIS — Z00.00 INITIAL MEDICARE ANNUAL WELLNESS VISIT: Primary | ICD-10-CM

## 2022-10-20 DIAGNOSIS — E21.3 HYPERPARATHYROIDISM (HCC): ICD-10-CM

## 2022-10-20 DIAGNOSIS — I10 PRIMARY HYPERTENSION: ICD-10-CM

## 2022-10-20 LAB — CALCIUM IONIZED: NORMAL

## 2022-10-20 PROCEDURE — 99212 OFFICE O/P EST SF 10 MIN: CPT | Performed by: FAMILY MEDICINE

## 2022-10-20 PROCEDURE — 1123F ACP DISCUSS/DSCN MKR DOCD: CPT | Performed by: FAMILY MEDICINE

## 2022-10-20 PROCEDURE — G0438 PPPS, INITIAL VISIT: HCPCS | Performed by: FAMILY MEDICINE

## 2022-10-20 RX ORDER — TRIAMCINOLONE ACETONIDE 1 MG/G
CREAM TOPICAL
Qty: 80 G | Refills: 0 | Status: SHIPPED | OUTPATIENT
Start: 2022-10-20

## 2022-10-20 RX ORDER — SIMVASTATIN 20 MG
20 TABLET ORAL NIGHTLY
Qty: 90 TABLET | Refills: 1 | Status: SHIPPED | OUTPATIENT
Start: 2022-10-20

## 2022-10-20 RX ORDER — METOPROLOL SUCCINATE 100 MG/1
200 TABLET, EXTENDED RELEASE ORAL 2 TIMES DAILY
Qty: 180 TABLET | Refills: 1 | Status: SHIPPED | OUTPATIENT
Start: 2022-10-20 | End: 2022-10-20 | Stop reason: SDUPTHER

## 2022-10-20 RX ORDER — NYSTATIN 100000 U/G
CREAM TOPICAL
Qty: 30 G | Refills: 0 | Status: SHIPPED | OUTPATIENT
Start: 2022-10-20

## 2022-10-20 RX ORDER — METOPROLOL SUCCINATE 100 MG/1
200 TABLET, EXTENDED RELEASE ORAL DAILY
Qty: 135 TABLET | Refills: 1 | Status: SHIPPED | OUTPATIENT
Start: 2022-10-20 | End: 2022-10-20 | Stop reason: SDUPTHER

## 2022-10-20 RX ORDER — AMLODIPINE BESYLATE 10 MG/1
10 TABLET ORAL DAILY
Qty: 90 TABLET | Refills: 1 | Status: SHIPPED | OUTPATIENT
Start: 2022-10-20

## 2022-10-20 RX ORDER — LOSARTAN POTASSIUM 100 MG/1
100 TABLET ORAL DAILY
Qty: 90 TABLET | Refills: 1 | Status: SHIPPED | OUTPATIENT
Start: 2022-10-20

## 2022-10-20 RX ORDER — METOPROLOL SUCCINATE 200 MG/1
200 TABLET, EXTENDED RELEASE ORAL DAILY
Qty: 90 TABLET | Refills: 1 | Status: SHIPPED | OUTPATIENT
Start: 2022-10-20 | End: 2023-04-18

## 2022-10-20 SDOH — ECONOMIC STABILITY: FOOD INSECURITY: WITHIN THE PAST 12 MONTHS, YOU WORRIED THAT YOUR FOOD WOULD RUN OUT BEFORE YOU GOT MONEY TO BUY MORE.: NEVER TRUE

## 2022-10-20 SDOH — ECONOMIC STABILITY: FOOD INSECURITY: WITHIN THE PAST 12 MONTHS, THE FOOD YOU BOUGHT JUST DIDN'T LAST AND YOU DIDN'T HAVE MONEY TO GET MORE.: NEVER TRUE

## 2022-10-20 ASSESSMENT — SOCIAL DETERMINANTS OF HEALTH (SDOH): HOW HARD IS IT FOR YOU TO PAY FOR THE VERY BASICS LIKE FOOD, HOUSING, MEDICAL CARE, AND HEATING?: NOT HARD AT ALL

## 2022-10-20 NOTE — PROGRESS NOTES
Attending attestation:  I personally performed and participated key or critical portions of the evaluation and management including personally performing the exam and medical decision making. I verify the accuracy of the documentation by the resident with the following addition or changes: Here for follow-up. Hypertension not quite controlled but close for age. Will bump metoprolol to 200 mg daily and continue other medcations. Other chronic issues are stable. Will repeat DEXA with endocrine; following with them for hyperparathyroidism. Following with dermatology also. New rash in neck folds consistent with candidal intertrigo. Will trial combination anti-fungal and steroid. Re-check in 6 months or sooner if needed. Resident adjusted metoprolol rx to call in 200 mg dose.         Electronically signed by Saul Dillard MD on 10/20/2022 at 10:26 AM

## 2022-10-20 NOTE — PATIENT INSTRUCTIONS
Personalized Preventive Plan for Laine Lopez - 10/20/2022  Medicare offers a range of preventive health benefits. Some of the tests and screenings are paid in full while other may be subject to a deductible, co-insurance, and/or copay. Some of these benefits include a comprehensive review of your medical history including lifestyle, illnesses that may run in your family, and various assessments and screenings as appropriate. After reviewing your medical record and screening and assessments performed today your provider may have ordered immunizations, labs, imaging, and/or referrals for you. A list of these orders (if applicable) as well as your Preventive Care list are included within your After Visit Summary for your review. Other Preventive Recommendations:    A preventive eye exam performed by an eye specialist is recommended every 1-2 years to screen for glaucoma; cataracts, macular degeneration, and other eye disorders. A preventive dental visit is recommended every 6 months. Try to get at least 150 minutes of exercise per week or 10,000 steps per day on a pedometer . Order or download the FREE \"Exercise & Physical Activity: Your Everyday Guide\" from [employer]. Call 8-558.491.6972 or search [employer] online. You need 1073-4520 mg of calcium and 5598-0816 IU of vitamin D per day. It is possible to meet your calcium requirement with diet alone, but a vitamin D supplement is usually necessary to meet this goal.  When exposed to the sun, use a sunscreen that protects against both UVA and UVB radiation with an SPF of 30 or greater. Reapply every 2 to 3 hours or after sweating, drying off with a towel, or swimming. Always wear a seat belt when traveling in a car. Always wear a helmet when riding a bicycle or motorcycle.

## 2022-10-20 NOTE — PROGRESS NOTES
Medicare Annual Wellness Visit    Daxa Esteban is here for Medicare AWV    Assessment & Plan   Initial Medicare annual wellness visit  -     MAMMO DEXA BONE DENSITY SCAN; Future  Primary hypertension  -     losartan (COZAAR) 100 MG tablet; Take 1 tablet by mouth daily, Disp-90 tablet, R-1Normal  -     amLODIPine (NORVASC) 10 MG tablet; Take 1 tablet by mouth daily, Disp-90 tablet, R-1Normal  -     metoprolol succinate (TOPROL XL) 100 MG extended release tablet; Take 2 tablets by mouth in the morning and at bedtime, Disp-180 tablet, R-1Normal  Hyperparathyroidism ([address]  Hyperlipidemia, unspecified hyperlipidemia type  -     simvastatin (ZOCOR) 20 MG tablet; Take 1 tablet by mouth nightly, Disp-90 tablet, R-1Normal  Leg swelling  Aortic valve stenosis, etiology of cardiac valve disease unspecified  Skin rash  -     nystatin (MYCOSTATIN) 778873 UNIT/GM cream; Apply topically 2 times daily. , Disp-30 g, R-0, Normal  -     triamcinolone (KENALOG) 0.1 % cream; Apply topically 2 times daily. , Disp-80 g, R-0, Normal        Annual Wellness - Patient due for Dexa Scan, last done in 2018. Counseled on weight loss including information on intermittent fasting and overcoming plateau phases of weight loss. Continue exercise regimen through the [facility]. HTN - Chronic, semi controlled with Norvasc, Losartan, and Toprol XL. Slightly elevated in office today, will increased Toprol XL to 200 mg daily. Patient to monitor BP's at home. Hyperparathyroidism - Reviewed labs, slight elevation in calcium. Will follow with Endocrinology twice yearly    Hyperlipidemia - Stable on Simvastatin     Leg swelling - Chronic, secondary to Aortic Stenosis. Using compression stockings with improvement noted to edema. Skin Rash - Noted to skin fold on right side of neck, states will sometimes get under beast. Has tried steroid cream in past and will sometimes work. Sent in Nystatin and Triamcinolone creams to try.      Recommendations for Preventive Services Due: see orders and patient instructions/AVS.  Recommended screening schedule for the next 5-10 years is provided to the patient in written form: see Patient Instructions/AVS.     Return in 6 months (on 4/20/2023) for chronic conditions,BP check . Subjective     Would like to lose more weight. States is switching to chicken and fish from pork and beef. Will eat most of food during day and just small sandwich at night. Goes to the [employer] for exercise, one hours program twice a week. Works out with [employer] Counseled regarding dietary changes, intermittent fasting, and overcoming plateau phases of weight loss. HTN - Does not check BP at home. On Norvasc, Losartan, and Toprol XL. Denies any chest pains or shortness of breath. Slightly elevated in office, will increase Toprol XL to 200 mg daily. Hyperparathyroidism - fazrana Gaines, twice a year     Aortic Stenosis - Mild to moderate in nature with valve area of 1.4 sq cm. Noted on recent Echo on 8/11/22. Using compression stockings for bilateral lower extremity edema and is helping. Patient's complete Health Risk Assessment and screening values have been reviewed and are found in Flowsheets. The following problems were reviewed today and where indicated follow up appointments were made and/or referrals ordered. Positive Risk Factor Screenings with Interventions:             General Health and ACP:  General  In general, how would you say your health is?: Good  In the past 7 days, have you experienced any of the following: New or Increased Pain, New or Increased Fatigue, Loneliness, Social Isolation, Stress or Anger?: No  Do you get the social and emotional support that you need?: Yes  Do you have a Living Will?: Yes    Advance Directives       Power of  Living Will ACP-Advance Directive ACP-Power of     Not on File Not on File Not on File Not on File        General Health Risk Interventions:  Has living will. States might have to change some people for medical power of . Objective   Vitals:    10/20/22 0929 10/20/22 0931   BP: (!) 140/70 (!) 150/70   Site: Left Upper Arm Right Upper Arm   Position: Sitting Sitting   Pulse: 72    Resp: 18    SpO2: 95%    Weight: 174 lb (78.9 kg)    Height: 5' 6.5\" (1.689 m)       Body mass index is 27.66 kg/m². General Appearance: alert and oriented to person, place and time, well developed and well- nourished, in no acute distress  Skin: warm and dry, no rash or erythema  Head: normocephalic and atraumatic  Pulmonary/Chest: clear to auscultation bilaterally- no wheezes, rales or rhonchi, normal air movement, no respiratory distress  Cardiovascular: normal rate, regular rhythm, normal S1 and S2, no murmurs, rubs, clicks, or gallops, distal pulses intact, no carotid bruits  Abdomen: soft, non-tender, non-distended, normal bowel sounds, no masses or organomegaly  Extremities: no cyanosis, clubbing. Trace bilateral lower extremity edema  Musculoskeletal: normal range of motion, no joint swelling, deformity or tenderness  Neurologic: reflexes normal and symmetric, no cranial nerve deficit, gait, coordination and speech normal       Allergies   Allergen Reactions    Sulfa Antibiotics      Prior to Visit Medications    Medication Sig Taking? Authorizing Provider   nystatin (MYCOSTATIN) 722192 UNIT/GM cream Apply topically 2 times daily. Yes Che Carrasco DO   triamcinolone (KENALOG) 0.1 % cream Apply topically 2 times daily.  Yes Che Carrasco DO   simvastatin (ZOCOR) 20 MG tablet Take 1 tablet by mouth nightly Yes Che Carrasco DO   losartan (COZAAR) 100 MG tablet Take 1 tablet by mouth daily Yes Che Carrasco DO   amLODIPine (NORVASC) 10 MG tablet Take 1 tablet by mouth daily Yes Che Carrasco DO   metoprolol succinate (TOPROL XL) 100 MG extended release tablet Take 2 tablets by mouth in the morning and at bedtime Yes Che Carrasco DO   Cholecalciferol (VITAMIN D3) 125 MCG (5000 UT) TABS Take by mouth daily Yes Historical Provider, MD   Melatonin 5 MG CAPS Take by mouth nightly as needed Yes Historical Provider, MD   fluticasone (FLONASE) 50 MCG/ACT nasal spray 2 sprays by Each Nostril route daily Yes Historical Provider, MD   guaiFENesin (MUCUS RELIEF ADULT PO) Take 1,200 mg by mouth in the morning and at bedtime Yes Historical Provider, MD       CareTeam (Including outside providers/suppliers regularly involved in providing care):   Patient Care Team:  Thea Schmidt MD as PCP - General (Family Medicine)  Thea Schmidt MD as PCP - [facility] Empaneled Provider     Reviewed and updated this visit:  Tobacco  Allergies  Meds  Med Hx  Surg Hx  Soc Hx  Fam Hx

## 2022-10-20 NOTE — TELEPHONE ENCOUNTER
[facility]   [facility] asking to verify that her dosage did increase to 2 tabs from the 1.5 .  If if is they need the Disp increased to match please

## 2022-10-21 ENCOUNTER — TELEPHONE (OUTPATIENT)
Dept: FAMILY MEDICINE CLINIC | Age: 72
End: 2022-10-21

## 2022-10-21 DIAGNOSIS — Z01.419 WELL WOMAN EXAM: Primary | ICD-10-CM

## 2022-11-17 ENCOUNTER — HOSPITAL ENCOUNTER (OUTPATIENT)
Dept: WOMENS IMAGING | Age: 72
Discharge: HOME | End: 2022-11-17
Payer: COMMERCIAL

## 2022-11-17 DIAGNOSIS — Z01.419 WELL WOMAN EXAM: ICD-10-CM

## 2022-11-17 PROCEDURE — 77080 DXA BONE DENSITY AXIAL: CPT

## 2023-04-17 SDOH — ECONOMIC STABILITY: FOOD INSECURITY: WITHIN THE PAST 12 MONTHS, THE FOOD YOU BOUGHT JUST DIDN'T LAST AND YOU DIDN'T HAVE MONEY TO GET MORE.: NEVER TRUE

## 2023-04-17 SDOH — ECONOMIC STABILITY: FOOD INSECURITY: WITHIN THE PAST 12 MONTHS, YOU WORRIED THAT YOUR FOOD WOULD RUN OUT BEFORE YOU GOT MONEY TO BUY MORE.: NEVER TRUE

## 2023-04-17 SDOH — ECONOMIC STABILITY: TRANSPORTATION INSECURITY
IN THE PAST 12 MONTHS, HAS LACK OF TRANSPORTATION KEPT YOU FROM MEETINGS, WORK, OR FROM GETTING THINGS NEEDED FOR DAILY LIVING?: NO

## 2023-04-17 SDOH — ECONOMIC STABILITY: INCOME INSECURITY: HOW HARD IS IT FOR YOU TO PAY FOR THE VERY BASICS LIKE FOOD, HOUSING, MEDICAL CARE, AND HEATING?: NOT HARD AT ALL

## 2023-04-17 SDOH — ECONOMIC STABILITY: HOUSING INSECURITY
IN THE LAST 12 MONTHS, WAS THERE A TIME WHEN YOU DID NOT HAVE A STEADY PLACE TO SLEEP OR SLEPT IN A SHELTER (INCLUDING NOW)?: NO

## 2023-04-20 ENCOUNTER — TELEPHONE (OUTPATIENT)
Dept: FAMILY MEDICINE CLINIC | Age: 73
End: 2023-04-20

## 2023-04-20 ENCOUNTER — OFFICE VISIT (OUTPATIENT)
Dept: FAMILY MEDICINE CLINIC | Age: 73
End: 2023-04-20
Payer: COMMERCIAL

## 2023-04-20 VITALS
HEIGHT: 66 IN | OXYGEN SATURATION: 98 % | BODY MASS INDEX: 28.12 KG/M2 | SYSTOLIC BLOOD PRESSURE: 138 MMHG | DIASTOLIC BLOOD PRESSURE: 68 MMHG | RESPIRATION RATE: 16 BRPM | HEART RATE: 67 BPM | WEIGHT: 175 LBS

## 2023-04-20 DIAGNOSIS — E21.3 HYPERPARATHYROIDISM (HCC): ICD-10-CM

## 2023-04-20 DIAGNOSIS — I10 PRIMARY HYPERTENSION: ICD-10-CM

## 2023-04-20 DIAGNOSIS — E78.5 HYPERLIPIDEMIA, UNSPECIFIED HYPERLIPIDEMIA TYPE: ICD-10-CM

## 2023-04-20 DIAGNOSIS — R05.3 CHRONIC COUGH: Primary | ICD-10-CM

## 2023-04-20 PROCEDURE — 1123F ACP DISCUSS/DSCN MKR DOCD: CPT | Performed by: FAMILY MEDICINE

## 2023-04-20 PROCEDURE — 3075F SYST BP GE 130 - 139MM HG: CPT | Performed by: FAMILY MEDICINE

## 2023-04-20 PROCEDURE — 3078F DIAST BP <80 MM HG: CPT | Performed by: FAMILY MEDICINE

## 2023-04-20 PROCEDURE — 99214 OFFICE O/P EST MOD 30 MIN: CPT | Performed by: FAMILY MEDICINE

## 2023-04-20 RX ORDER — HYDROCHLOROTHIAZIDE 50 MG/1
50 TABLET ORAL DAILY
Qty: 30 TABLET | Refills: 1 | Status: SHIPPED | OUTPATIENT
Start: 2023-04-20

## 2023-04-20 RX ORDER — SIMVASTATIN 20 MG
20 TABLET ORAL NIGHTLY
Qty: 90 TABLET | Refills: 1 | Status: SHIPPED | OUTPATIENT
Start: 2023-04-20

## 2023-04-20 RX ORDER — AMLODIPINE BESYLATE 10 MG/1
10 TABLET ORAL DAILY
Qty: 90 TABLET | Refills: 1 | Status: SHIPPED | OUTPATIENT
Start: 2023-04-20

## 2023-04-20 RX ORDER — LOSARTAN POTASSIUM 100 MG/1
100 TABLET ORAL DAILY
Qty: 90 TABLET | Refills: 1 | Status: CANCELLED | OUTPATIENT
Start: 2023-04-20

## 2023-04-20 RX ORDER — METOPROLOL SUCCINATE 200 MG/1
200 TABLET, EXTENDED RELEASE ORAL DAILY
Qty: 90 TABLET | Refills: 1 | Status: SHIPPED | OUTPATIENT
Start: 2023-04-20 | End: 2023-10-17

## 2023-04-20 ASSESSMENT — ENCOUNTER SYMPTOMS
SORE THROAT: 0
DIARRHEA: 0
BACK PAIN: 0
CONSTIPATION: 0
SHORTNESS OF BREATH: 0
NAUSEA: 0
VOMITING: 0
EYE DISCHARGE: 0
EYE REDNESS: 0
ABDOMINAL PAIN: 0
COUGH: 1

## 2023-04-20 ASSESSMENT — PATIENT HEALTH QUESTIONNAIRE - PHQ9
SUM OF ALL RESPONSES TO PHQ9 QUESTIONS 1 & 2: 0
SUM OF ALL RESPONSES TO PHQ QUESTIONS 1-9: 0
1. LITTLE INTEREST OR PLEASURE IN DOING THINGS: 0
2. FEELING DOWN, DEPRESSED OR HOPELESS: 0
SUM OF ALL RESPONSES TO PHQ QUESTIONS 1-9: 0

## 2023-04-20 NOTE — PROGRESS NOTES
disturbance. The patient is not nervous/anxious. Objective:     Vitals:    04/20/23 1012   BP: 138/68   Site: Left Upper Arm   Position: Sitting   Pulse: 67   Resp: 16   SpO2: 98%   Weight: 175 lb (79.4 kg)   Height: 5' 6\" (1.676 m)       Physical Exam  Vitals reviewed. Constitutional:       General: She is not in acute distress. Appearance: She is well-developed. She is not ill-appearing or toxic-appearing. HENT:      Head: Normocephalic and atraumatic. Eyes:      Conjunctiva/sclera: Conjunctivae normal.   Cardiovascular:      Rate and Rhythm: Normal rate and regular rhythm. Heart sounds: Normal heart sounds. Pulmonary:      Effort: Pulmonary effort is normal. No respiratory distress. Breath sounds: Normal breath sounds. Abdominal:      Palpations: Abdomen is soft. Tenderness: There is no abdominal tenderness. Musculoskeletal:      Cervical back: Neck supple. Right lower leg: No edema. Left lower leg: No edema. Skin:     General: Skin is warm and dry. Comments: No obvious rash. Neurological:      Mental Status: She is alert. Comments: No obvious focal deficit. Psychiatric:         Behavior: Behavior normal.     No obvious joint deformities. Lab Results   Component Value Date    WBC 6.2 10/12/2022    HGB 13.8 10/12/2022    HCT 42.6 10/12/2022     10/12/2022    HDL 50 10/12/2022    ALT 13 10/12/2022    AST 15 10/12/2022     10/12/2022    K 5.1 10/12/2022     10/12/2022    CREATININE 0.6 10/12/2022    BUN 12 10/12/2022    CO2 26 10/12/2022    TSH 2.680 10/12/2022       Kd Fabry:   1. Primary hypertension  At goal but cough may be side effect of losartan. Will stop this and start hydrochlorothiazide. Re-check in 1 month. - amLODIPine (NORVASC) 10 MG tablet; Take 1 tablet by mouth daily  Dispense: 90 tablet; Refill: 1  - metoprolol succinate (TOPROL XL) 200 MG extended release tablet;  Take 1 tablet by mouth daily  Dispense: 90 tablet;

## 2023-04-24 DIAGNOSIS — I10 PRIMARY HYPERTENSION: ICD-10-CM

## 2023-04-24 DIAGNOSIS — R05.3 CHRONIC COUGH: ICD-10-CM

## 2023-04-24 RX ORDER — HYDROCHLOROTHIAZIDE 50 MG/1
50 TABLET ORAL DAILY
Qty: 90 TABLET | Refills: 1 | Status: SHIPPED | OUTPATIENT
Start: 2023-04-24

## 2023-04-24 NOTE — TELEPHONE ENCOUNTER
Pts insurance requesting a 90 day script to be sent into pharmacy.        Patient's last appointment was : 4/20/2023  Patient's next appointment is : 5/25/2023  Last sent in: 4/20/23 30 tabs with 1 refill

## 2023-05-25 ENCOUNTER — OFFICE VISIT (OUTPATIENT)
Dept: FAMILY MEDICINE CLINIC | Age: 73
End: 2023-05-25
Payer: COMMERCIAL

## 2023-05-25 VITALS
HEIGHT: 66 IN | WEIGHT: 177.6 LBS | SYSTOLIC BLOOD PRESSURE: 141 MMHG | OXYGEN SATURATION: 97 % | DIASTOLIC BLOOD PRESSURE: 65 MMHG | BODY MASS INDEX: 28.54 KG/M2 | HEART RATE: 63 BPM | RESPIRATION RATE: 16 BRPM

## 2023-05-25 DIAGNOSIS — E78.5 HYPERLIPIDEMIA, UNSPECIFIED HYPERLIPIDEMIA TYPE: ICD-10-CM

## 2023-05-25 DIAGNOSIS — E21.3 HYPERPARATHYROIDISM (HCC): ICD-10-CM

## 2023-05-25 DIAGNOSIS — R05.3 CHRONIC COUGH: Primary | ICD-10-CM

## 2023-05-25 DIAGNOSIS — M85.80 OSTEOPENIA, UNSPECIFIED LOCATION: ICD-10-CM

## 2023-05-25 DIAGNOSIS — I10 PRIMARY HYPERTENSION: ICD-10-CM

## 2023-05-25 PROCEDURE — 3078F DIAST BP <80 MM HG: CPT | Performed by: FAMILY MEDICINE

## 2023-05-25 PROCEDURE — 3077F SYST BP >= 140 MM HG: CPT | Performed by: FAMILY MEDICINE

## 2023-05-25 PROCEDURE — 99214 OFFICE O/P EST MOD 30 MIN: CPT | Performed by: FAMILY MEDICINE

## 2023-05-25 PROCEDURE — 1123F ACP DISCUSS/DSCN MKR DOCD: CPT | Performed by: FAMILY MEDICINE

## 2023-05-25 RX ORDER — LOSARTAN POTASSIUM 100 MG/1
100 TABLET ORAL DAILY
Qty: 90 TABLET | Refills: 1 | Status: SHIPPED | OUTPATIENT
Start: 2023-05-25

## 2023-05-25 ASSESSMENT — ENCOUNTER SYMPTOMS
EYE REDNESS: 0
COUGH: 1
ABDOMINAL PAIN: 0
DIARRHEA: 0
NAUSEA: 0
VOMITING: 0
EYE DISCHARGE: 0
BACK PAIN: 0
CONSTIPATION: 0
SORE THROAT: 0
SHORTNESS OF BREATH: 0

## 2023-05-25 NOTE — PROGRESS NOTES
[address] [address]  Dept: [phone]  Dept Fax: [phone]  Loc: [phone]      Romeo Michael is a 74 y.o. female who presents todayfor her medical conditions/complaints as noted below. Romeo Michael is c/o of Blood Pressure Check (Changed medicine to water pill. Doesn't feel like water pill has helped. Wants to go back to losartan instead of water pill. Occasionally checks BP at home. )      :     HPI    Here for re-check. Cough is still present. Intermittent. Maybe for a minute once a day and coughs up mucus. Then good again. No change with blood pressure swap. Uses cough drops. Thinks allergic to cat but does not want to change this. Chest x-ray was stable. Will follow. Blood pressure is high today. Brings log and numbers range from 548-042 systolic. Usually in 130's. Exercises. Goes to [employer]. Getting calcium in diet. No side effects on hydrochlorothiazide but wondering if could avoid with sun sensitivity and sulfa allergy. Patient Active Problem List   Diagnosis    Allergies    Cancer ([address]    Hyperlipidemia    Hyperparathyroidism ([address]    Hypertension      Goals    None       The patient is allergic to sulfa antibiotics. Medical History  Michael Mac has a past medical history of Allergic rhinitis, Allergies, Aortic stenosis, Cancer ([address] Hyperlipidemia, Hyperparathyroidism ([address] Hypertension, and Osteopenia of multiple sites. Past SurgicalHistory  The patient  has a past surgical history that includes Breast surgery (Left); Hysterectomy, vaginal; LEEP; and Hysterectomy, total abdominal (04/30/2008).     Family History  This patient's family history includes Alzheimer's Disease in her mother; Arthritis in her sister; Breast Cancer in her sister and sister; Cancer in her brother, father, and paternal grandfather; Cancer (age of onset: 40) in her brother; Diabetes in her

## 2023-08-10 ENCOUNTER — NURSE ONLY (OUTPATIENT)
Dept: LAB | Age: 73
End: 2023-08-10

## 2023-08-10 DIAGNOSIS — E21.3 HYPERPARATHYROIDISM (HCC): ICD-10-CM

## 2023-08-10 DIAGNOSIS — E78.5 HYPERLIPIDEMIA, UNSPECIFIED HYPERLIPIDEMIA TYPE: ICD-10-CM

## 2023-08-10 DIAGNOSIS — M85.80 OSTEOPENIA, UNSPECIFIED LOCATION: ICD-10-CM

## 2023-08-10 DIAGNOSIS — R79.89 ELEVATED LIVER FUNCTION TESTS: Primary | ICD-10-CM

## 2023-08-10 DIAGNOSIS — I10 PRIMARY HYPERTENSION: ICD-10-CM

## 2023-08-10 LAB
25(OH)D3 SERPL-MCNC: 53 NG/ML (ref 30–100)
ALBUMIN SERPL BCG-MCNC: 4.3 G/DL (ref 3.5–5.1)
ALP SERPL-CCNC: 146 U/L (ref 38–126)
ALT SERPL W/O P-5'-P-CCNC: 13 U/L (ref 11–66)
ANION GAP SERPL CALC-SCNC: 11 MEQ/L (ref 8–16)
AST SERPL-CCNC: 16 U/L (ref 5–40)
BASOPHILS ABSOLUTE: 0 THOU/MM3 (ref 0–0.1)
BASOPHILS NFR BLD AUTO: 0.6 %
BILIRUB SERPL-MCNC: 0.6 MG/DL (ref 0.3–1.2)
BUN SERPL-MCNC: 11 MG/DL (ref 7–22)
CALCIUM SERPL-MCNC: 10.4 MG/DL (ref 8.5–10.5)
CALCIUM SERPL-MCNC: 10.5 MG/DL (ref 8.5–10.5)
CHLORIDE SERPL-SCNC: 101 MEQ/L (ref 98–111)
CHOLESTEROL, FASTING: 178 MG/DL (ref 100–199)
CO2 SERPL-SCNC: 25 MEQ/L (ref 23–33)
CREAT SERPL-MCNC: 0.5 MG/DL (ref 0.4–1.2)
DEPRECATED RDW RBC AUTO: 41.5 FL (ref 35–45)
EOSINOPHIL NFR BLD AUTO: 4.7 %
EOSINOPHILS ABSOLUTE: 0.3 THOU/MM3 (ref 0–0.4)
ERYTHROCYTE [DISTWIDTH] IN BLOOD BY AUTOMATED COUNT: 12 % (ref 11.5–14.5)
GFR SERPL CREATININE-BSD FRML MDRD: > 60 ML/MIN/1.73M2
GLUCOSE SERPL-MCNC: 100 MG/DL (ref 70–108)
HCT VFR BLD AUTO: 40.6 % (ref 37–47)
HDLC SERPL-MCNC: 54 MG/DL
HGB BLD-MCNC: 13.4 GM/DL (ref 12–16)
IMM GRANULOCYTES # BLD AUTO: 0.01 THOU/MM3 (ref 0–0.07)
IMM GRANULOCYTES NFR BLD AUTO: 0.2 %
LDLC SERPL CALC-MCNC: 96 MG/DL
LYMPHOCYTES ABSOLUTE: 2.3 THOU/MM3 (ref 1–4.8)
LYMPHOCYTES NFR BLD AUTO: 34.8 %
MCH RBC QN AUTO: 31.4 PG (ref 26–33)
MCHC RBC AUTO-ENTMCNC: 33 GM/DL (ref 32.2–35.5)
MCV RBC AUTO: 95.1 FL (ref 81–99)
MONOCYTES ABSOLUTE: 0.7 THOU/MM3 (ref 0.4–1.3)
MONOCYTES NFR BLD AUTO: 10.1 %
NEUTROPHILS NFR BLD AUTO: 49.6 %
NRBC BLD AUTO-RTO: 0 /100 WBC
PHOSPHATE SERPL-MCNC: 3 MG/DL (ref 2.4–4.7)
PLATELET # BLD AUTO: 284 THOU/MM3 (ref 130–400)
PMV BLD AUTO: 10.1 FL (ref 9.4–12.4)
POTASSIUM SERPL-SCNC: 4.1 MEQ/L (ref 3.5–5.2)
PROT SERPL-MCNC: 7.3 G/DL (ref 6.1–8)
PTH-INTACT SERPL-MCNC: 70 PG/ML (ref 15–65)
RBC # BLD AUTO: 4.27 MILL/MM3 (ref 4.2–5.4)
SEGMENTED NEUTROPHILS ABSOLUTE COUNT: 3.3 THOU/MM3 (ref 1.8–7.7)
SODIUM SERPL-SCNC: 137 MEQ/L (ref 135–145)
TRIGLYCERIDE, FASTING: 142 MG/DL (ref 0–199)
TSH SERPL DL<=0.005 MIU/L-ACNC: 4.11 UIU/ML (ref 0.4–4.2)
WBC # BLD AUTO: 6.6 THOU/MM3 (ref 4.8–10.8)

## 2023-08-11 ENCOUNTER — TELEPHONE (OUTPATIENT)
Dept: FAMILY MEDICINE CLINIC | Age: 73
End: 2023-08-11

## 2023-08-11 NOTE — TELEPHONE ENCOUNTER
----- Message from Ba Zee MD sent at 8/10/2023  8:25 PM EDT -----  Can we add on GGT to blood in lab? Thanks.

## 2023-08-14 DIAGNOSIS — R79.89 ELEVATED LIVER FUNCTION TESTS: ICD-10-CM

## 2023-08-14 LAB
CALCIUM 24H UR-MRATE: 363 MG/24HR (ref 100–240)
CALCIUM UR-MCNC: 14.5 MG/DL
CREAT 24H UR-MRATE: 1 GM/24HR
CREAT UR-MCNC: 41.4 MG/DL
GGT SERPL-CCNC: 24 U/L (ref 8–69)
HOURS COLLECTED: 24 HRS
HOURS COLLECTED: 24 HRS
URINE VOLUME MEASURE: 2500 ML
URINE VOLUME, 24 HOUR: 2500 ML

## 2023-10-17 DIAGNOSIS — I10 PRIMARY HYPERTENSION: ICD-10-CM

## 2023-10-17 DIAGNOSIS — E78.5 HYPERLIPIDEMIA, UNSPECIFIED HYPERLIPIDEMIA TYPE: ICD-10-CM

## 2023-10-17 NOTE — TELEPHONE ENCOUNTER
Patient's last appointment was : 5/25/2023  Patient's next appointment is : 11/20/2023  Last sent in: 4/20/23 90 tabs with 1 refill

## 2023-10-19 RX ORDER — SIMVASTATIN 20 MG
20 TABLET ORAL NIGHTLY
Qty: 90 TABLET | Refills: 0 | Status: SHIPPED | OUTPATIENT
Start: 2023-10-19

## 2023-10-19 RX ORDER — AMLODIPINE BESYLATE 10 MG/1
10 TABLET ORAL DAILY
Qty: 90 TABLET | Refills: 0 | Status: SHIPPED | OUTPATIENT
Start: 2023-10-19

## 2023-10-19 RX ORDER — METOPROLOL SUCCINATE 200 MG/1
200 TABLET, EXTENDED RELEASE ORAL DAILY
Qty: 90 TABLET | Refills: 0 | Status: SHIPPED | OUTPATIENT
Start: 2023-10-19

## 2023-11-08 DIAGNOSIS — I10 PRIMARY HYPERTENSION: ICD-10-CM

## 2023-11-08 NOTE — TELEPHONE ENCOUNTER
Patient's last appointment was : 5/25/2023  Patient's next appointment is : 11/20/2023  Last refilled:5/25/23, #90, 1 refill

## 2023-11-09 RX ORDER — LOSARTAN POTASSIUM 100 MG/1
100 TABLET ORAL DAILY
Qty: 90 TABLET | Refills: 0 | Status: SHIPPED | OUTPATIENT
Start: 2023-11-09

## 2023-11-20 ENCOUNTER — OFFICE VISIT (OUTPATIENT)
Dept: FAMILY MEDICINE CLINIC | Age: 73
End: 2023-11-20
Payer: COMMERCIAL

## 2023-11-20 VITALS
SYSTOLIC BLOOD PRESSURE: 138 MMHG | HEIGHT: 66 IN | RESPIRATION RATE: 17 BRPM | WEIGHT: 184 LBS | HEART RATE: 77 BPM | OXYGEN SATURATION: 98 % | DIASTOLIC BLOOD PRESSURE: 64 MMHG | BODY MASS INDEX: 29.57 KG/M2

## 2023-11-20 DIAGNOSIS — E21.3 HYPERPARATHYROIDISM (HCC): ICD-10-CM

## 2023-11-20 DIAGNOSIS — I10 PRIMARY HYPERTENSION: ICD-10-CM

## 2023-11-20 DIAGNOSIS — M85.80 OSTEOPENIA, UNSPECIFIED LOCATION: ICD-10-CM

## 2023-11-20 DIAGNOSIS — R05.3 CHRONIC COUGH: ICD-10-CM

## 2023-11-20 DIAGNOSIS — E78.5 HYPERLIPIDEMIA, UNSPECIFIED HYPERLIPIDEMIA TYPE: Primary | ICD-10-CM

## 2023-11-20 DIAGNOSIS — J30.2 SEASONAL ALLERGIES: ICD-10-CM

## 2023-11-20 DIAGNOSIS — C44.91 SKIN CANCER, BASAL CELL: ICD-10-CM

## 2023-11-20 DIAGNOSIS — Z91.81 AT HIGH RISK FOR FALLS: ICD-10-CM

## 2023-11-20 PROCEDURE — 99214 OFFICE O/P EST MOD 30 MIN: CPT | Performed by: FAMILY MEDICINE

## 2023-11-20 PROCEDURE — 3078F DIAST BP <80 MM HG: CPT | Performed by: FAMILY MEDICINE

## 2023-11-20 PROCEDURE — 1123F ACP DISCUSS/DSCN MKR DOCD: CPT | Performed by: FAMILY MEDICINE

## 2023-11-20 PROCEDURE — 3075F SYST BP GE 130 - 139MM HG: CPT | Performed by: FAMILY MEDICINE

## 2023-11-20 RX ORDER — METOPROLOL SUCCINATE 200 MG/1
200 TABLET, EXTENDED RELEASE ORAL DAILY
Qty: 90 TABLET | Refills: 1 | Status: SHIPPED | OUTPATIENT
Start: 2023-11-20

## 2023-11-20 RX ORDER — SIMVASTATIN 20 MG
20 TABLET ORAL NIGHTLY
Qty: 90 TABLET | Refills: 1 | Status: SHIPPED | OUTPATIENT
Start: 2023-11-20

## 2023-11-20 RX ORDER — LOSARTAN POTASSIUM 100 MG/1
100 TABLET ORAL DAILY
Qty: 90 TABLET | Refills: 1 | Status: SHIPPED | OUTPATIENT
Start: 2023-11-20

## 2023-11-20 RX ORDER — AMLODIPINE BESYLATE 10 MG/1
10 TABLET ORAL DAILY
Qty: 90 TABLET | Refills: 1 | Status: SHIPPED | OUTPATIENT
Start: 2023-11-20

## 2023-11-20 ASSESSMENT — ENCOUNTER SYMPTOMS
BACK PAIN: 0
ABDOMINAL PAIN: 0
COUGH: 1
EYE DISCHARGE: 0
SHORTNESS OF BREATH: 0
DIARRHEA: 0
VOMITING: 0
NAUSEA: 0
EYE REDNESS: 0
CONSTIPATION: 0
SORE THROAT: 0

## 2023-11-20 NOTE — PROGRESS NOTES
[address] [place] [id number]  Dept: [phone]  Dept Fax: [phone]  Loc: [phone]      Marbella Tyler is a 74 y.o. female who presents todayfor Other (Diagnosed with basocell carcoma on breast//Talk about RSV shot )      :   HPI    Here for follow-up. Cough. Blood pressure at goal.      Basal cell carcinoma on breast; had shave biopsy; Mohs excision pending. Encouraged RSV shot at pharmacy. Had COVID-19 and flu shot already. Blood pressures okay. 125/62 - 143/59. Most readings in 120's. Did fall once. Sidewalk had a crack in it and she started to tumble forward. Managed to fall on grass; smashed nose. No serious injury. Had labs in August with endocrine. Sees next year. patient is allergic to sulfa antibiotics. Past Megan Elmore  has a past medical history of Allergic rhinitis, Allergies, Aortic stenosis, Cancer ([address]), Hyperlipidemia, Hyperparathyroidism ([address]), Hypertension, and Osteopenia of multiple sites. Past Surgical History  The patient  has a past surgical history that includes Breast surgery (Left); Hysterectomy, vaginal; LEEP; and Hysterectomy, total abdominal (04/30/2008). Family History  This patient's family history includes Alzheimer's Disease in her mother; Arthritis in her sister; Breast Cancer in her sister and sister; Cancer in her brother, father, and paternal grandfather; Cancer (age of onset: 40) in her brother; Diabetes in her mother; Hearing Loss in her father; High Blood Pressure in her father and mother; High Cholesterol in her father; Prostate Cancer in her father; Stroke in her brother and brother. Social History  Meli Wiley  reports that she has never smoked. She has never been exposed to tobacco smoke. She has never used smokeless tobacco. She reports that she does not currently use alcohol.  She reports that she does not use

## 2023-12-04 LAB — MAMMOGRAPHY, EXTERNAL: NORMAL

## 2024-02-27 ASSESSMENT — PATIENT HEALTH QUESTIONNAIRE - PHQ9
SUM OF ALL RESPONSES TO PHQ QUESTIONS 1-9: 0
SUM OF ALL RESPONSES TO PHQ9 QUESTIONS 1 & 2: 0
SUM OF ALL RESPONSES TO PHQ QUESTIONS 1-9: 0
2. FEELING DOWN, DEPRESSED OR HOPELESS: NOT AT ALL
2. FEELING DOWN, DEPRESSED OR HOPELESS: 0
1. LITTLE INTEREST OR PLEASURE IN DOING THINGS: NOT AT ALL
SUM OF ALL RESPONSES TO PHQ9 QUESTIONS 1 & 2: 0
SUM OF ALL RESPONSES TO PHQ QUESTIONS 1-9: 0
1. LITTLE INTEREST OR PLEASURE IN DOING THINGS: 0
SUM OF ALL RESPONSES TO PHQ QUESTIONS 1-9: 0

## 2024-02-29 ENCOUNTER — OFFICE VISIT (OUTPATIENT)
Dept: FAMILY MEDICINE CLINIC | Age: 74
End: 2024-02-29
Payer: COMMERCIAL

## 2024-02-29 VITALS
BODY MASS INDEX: 28.12 KG/M2 | WEIGHT: 175 LBS | RESPIRATION RATE: 16 BRPM | DIASTOLIC BLOOD PRESSURE: 68 MMHG | HEART RATE: 65 BPM | HEIGHT: 66 IN | SYSTOLIC BLOOD PRESSURE: 128 MMHG | OXYGEN SATURATION: 96 %

## 2024-02-29 DIAGNOSIS — M25.562 LEFT ANTERIOR KNEE PAIN: Primary | ICD-10-CM

## 2024-02-29 PROCEDURE — 3074F SYST BP LT 130 MM HG: CPT | Performed by: FAMILY MEDICINE

## 2024-02-29 PROCEDURE — 3078F DIAST BP <80 MM HG: CPT | Performed by: FAMILY MEDICINE

## 2024-02-29 PROCEDURE — 1123F ACP DISCUSS/DSCN MKR DOCD: CPT | Performed by: FAMILY MEDICINE

## 2024-02-29 PROCEDURE — 99213 OFFICE O/P EST LOW 20 MIN: CPT | Performed by: FAMILY MEDICINE

## 2024-02-29 ASSESSMENT — ENCOUNTER SYMPTOMS
CONSTIPATION: 0
ABDOMINAL PAIN: 0
SHORTNESS OF BREATH: 0
WHEEZING: 0
VOMITING: 0
COUGH: 0
NAUSEA: 0
CHEST TIGHTNESS: 0
DIARRHEA: 0

## 2024-02-29 NOTE — PROGRESS NOTES
Attending attestation:  I personally performed and participated key or critical portions of the evaluation and management including personally performing the exam and medical decision making.  I verify the accuracy of the documentation by the resident with the following addition or changes: Here for chronic knee pain. Worse after cycling.  Using heating pad which helps a lot.  Took tylenol arthritis which also helped.  Still exercising and going to the [facility]. Pain is 1/10. Considered cancelling. Full range of motion.  Reassuring exam.  No erythema or step off. Continue with supportive care.  Will send in diclofenac gel.  Indications for prompt return and/or emergent presentation if worsening reviewed in detail.  Encourage RSV vaccine.           Electronically signed by Nohemi Jesus MD on 2/29/2024 at 9:56 AM

## 2024-02-29 NOTE — PROGRESS NOTES
[facility] PROFESSIONAL [facility]  [facility]  [address] [facility] [address]  Dept: [phone]  Dept Fax: [phone]  Loc: [phone]      Trinidad Smart is a 73 y.o. female who presents todayfor Joint Pain (Left knee pain for years, was getting worse but not bad today. /Hard to sit down and get up. Is having stiffness, achy pain when walking. /Heating pads helps pain. )      :   Patient is a 73-year-old female who presents for left knee pain.  Patient states that a few weeks ago she was doing some sitting cycling exercises and leg movement exercises and then the next day when she woke up she started having increased left knee pain.  She notes that she has had chronic left knee pain and describes it as a tightness but states that the tightness got worse this time after the exercises.  She states that she has been using a heating pad which has helped significantly improved the pain and is also 1 day taking Tylenol arthritis and states that improved the pain a lot to.  She notes at present the pain is much improved and rates it a 1-1.5 out of 10.  She states that she has been going to the [facility] and exercising and worked out last yesterday.  She has no other concerns at this time.         patient is allergic to sulfa antibiotics.    Past MedicalHistory  Trinidad  has a past medical history of Allergic rhinitis, Allergies, Aortic stenosis, Cancer (HCC), Hyperlipidemia, Hyperparathyroidism (HCC), Hypertension, Osteopenia of multiple sites, and Skin cancer, basal cell.    Past Surgical History  The patient  has a past surgical history that includes Breast surgery (Left); Hysterectomy, vaginal; LEEP; and Hysterectomy, total abdominal (04/30/2008).    Family History  This patient's family history includes Alzheimer's Disease in her mother; Arthritis in her sister; Breast Cancer in her sister and sister; Cancer in her brother, father, and paternal grandfather; Cancer (age of

## 2024-03-01 ENCOUNTER — PATIENT MESSAGE (OUTPATIENT)
Dept: FAMILY MEDICINE CLINIC | Age: 74
End: 2024-03-01

## 2024-03-01 NOTE — TELEPHONE ENCOUNTER
From: Trinidad Smart  To: Dr. Katelyn Ann Leopold  Sent: 3/1/2024 2:00 PM EST  Subject: Primary Care Physician    Hi, Dr Leopold. I used to be your patient when you were in [place]. I live in [place] and plan to move to [place] in the future. I have been seeing Dr Jesus in [place]. As I and my car are getting older, I would like to have a shorter drive to see a doctor. Are you accepting new patients? My insurance requires that I have designated a Primary Care Physician. Could I be your patient again? I have been pleased with my care by Dr Jesus and have explained to her my reason for changing doctors.

## 2024-03-23 SDOH — HEALTH STABILITY: PHYSICAL HEALTH: ON AVERAGE, HOW MANY DAYS PER WEEK DO YOU ENGAGE IN MODERATE TO STRENUOUS EXERCISE (LIKE A BRISK WALK)?: 2 DAYS

## 2024-03-23 SDOH — HEALTH STABILITY: PHYSICAL HEALTH: ON AVERAGE, HOW MANY MINUTES DO YOU ENGAGE IN EXERCISE AT THIS LEVEL?: 60 MIN

## 2024-03-26 ENCOUNTER — OFFICE VISIT (OUTPATIENT)
Dept: FAMILY MEDICINE CLINIC | Age: 74
End: 2024-03-26
Payer: COMMERCIAL

## 2024-03-26 VITALS
DIASTOLIC BLOOD PRESSURE: 72 MMHG | OXYGEN SATURATION: 98 % | BODY MASS INDEX: 28.83 KG/M2 | WEIGHT: 179.4 LBS | HEIGHT: 66 IN | HEART RATE: 68 BPM | SYSTOLIC BLOOD PRESSURE: 128 MMHG

## 2024-03-26 DIAGNOSIS — I10 PRIMARY HYPERTENSION: Primary | ICD-10-CM

## 2024-03-26 DIAGNOSIS — C44.91 SKIN CANCER, BASAL CELL: ICD-10-CM

## 2024-03-26 DIAGNOSIS — C80.1 CANCER (HCC): ICD-10-CM

## 2024-03-26 DIAGNOSIS — E21.3 HYPERPARATHYROIDISM (HCC): ICD-10-CM

## 2024-03-26 DIAGNOSIS — E78.5 HYPERLIPIDEMIA, UNSPECIFIED HYPERLIPIDEMIA TYPE: ICD-10-CM

## 2024-03-26 PROCEDURE — 3074F SYST BP LT 130 MM HG: CPT | Performed by: FAMILY MEDICINE

## 2024-03-26 PROCEDURE — 1123F ACP DISCUSS/DSCN MKR DOCD: CPT | Performed by: FAMILY MEDICINE

## 2024-03-26 PROCEDURE — 99214 OFFICE O/P EST MOD 30 MIN: CPT | Performed by: FAMILY MEDICINE

## 2024-03-26 PROCEDURE — 3078F DIAST BP <80 MM HG: CPT | Performed by: FAMILY MEDICINE

## 2024-03-26 RX ORDER — LOSARTAN POTASSIUM 100 MG/1
100 TABLET ORAL DAILY
Qty: 90 TABLET | Refills: 3 | Status: SHIPPED | OUTPATIENT
Start: 2024-03-26

## 2024-03-26 RX ORDER — SIMVASTATIN 20 MG
20 TABLET ORAL NIGHTLY
Qty: 90 TABLET | Refills: 3 | Status: SHIPPED | OUTPATIENT
Start: 2024-03-26

## 2024-03-26 RX ORDER — METOPROLOL SUCCINATE 200 MG/1
200 TABLET, EXTENDED RELEASE ORAL DAILY
Qty: 90 TABLET | Refills: 3 | Status: SHIPPED | OUTPATIENT
Start: 2024-03-26

## 2024-03-26 RX ORDER — AMLODIPINE BESYLATE 10 MG/1
10 TABLET ORAL DAILY
Qty: 90 TABLET | Refills: 3 | Status: SHIPPED | OUTPATIENT
Start: 2024-03-26

## 2024-03-26 ASSESSMENT — ENCOUNTER SYMPTOMS
COUGH: 0
TROUBLE SWALLOWING: 0
SHORTNESS OF BREATH: 0
SORE THROAT: 0
VOMITING: 0
DIARRHEA: 0
CONSTIPATION: 0
ABDOMINAL PAIN: 0

## 2024-03-26 NOTE — PROGRESS NOTES
[facility] [place] [facility]  [address]  [place] [address]  Dept: [phone]     SUBJECTIVE     Trinidad Smart is a 73 y.o.female    Pt presents for follow up of medications.     Pt feeling ok since last visit- interval history and any new issues noted below:     Seeing Dr. Isaac in August for hyperparathyroidism.  Ca stable around 10.4.  PTH has been mildly increasing.  Has labs scheduled.    Follows with Dr. Champagne for yearly exam.  Hx BCC on breast.    Echo 2022 with mild-mod aortic valve stenosis- had one consult with Dr. Mandujano and ok to keep following prn.  No POP.  Goes to the [facility] for classes few times per week.  Balance and stretching.      Voltaren gel helping with knee pain as well as exercising.  Massage and heat help.      HTN- well controlled on current medications.     Patient Active Problem List   Diagnosis    Allergies    Cancer (HCC)    Hyperlipidemia    Hyperparathyroidism (HCC)    Hypertension    Skin cancer, basal cell       Current Outpatient Medications   Medication Sig Dispense Refill    amLODIPine (NORVASC) 10 MG tablet Take 1 tablet by mouth daily 90 tablet 3    losartan (COZAAR) 100 MG tablet Take 1 tablet by mouth daily 90 tablet 3    metoprolol succinate (TOPROL XL) 200 MG extended release tablet Take 1 tablet by mouth daily 90 tablet 3    simvastatin (ZOCOR) 20 MG tablet Take 1 tablet by mouth nightly 90 tablet 3    diclofenac sodium (VOLTAREN) 1 % GEL Apply 4 g topically 4 times daily 350 g 0    AZELASTINE HCL OP       Cholecalciferol (VITAMIN D3) 125 MCG (5000 UT) TABS Take by mouth daily      Melatonin 5 MG CAPS Take by mouth nightly as needed      fluticasone (FLONASE) 50 MCG/ACT nasal spray 2 sprays by Each Nostril route daily      guaiFENesin (MUCUS RELIEF ADULT PO) Take 1,200 mg by mouth in the morning and at bedtime       No current facility-administered medications for this visit.       Review of Systems   Constitutional:  Negative

## 2024-06-02 ENCOUNTER — PATIENT MESSAGE (OUTPATIENT)
Dept: FAMILY MEDICINE CLINIC | Age: 74
End: 2024-06-02

## 2024-06-02 DIAGNOSIS — E21.3 HYPERPARATHYROIDISM (HCC): Primary | ICD-10-CM

## 2024-06-07 NOTE — TELEPHONE ENCOUNTER
From: Trinidad Smart  Sent: 6/7/2024 2:22 PM EDT  To: [facility] Clinical Staff  Subject: Endocrinologist's senior care    Hi, Dr Leopold. Have you found out if Dr Villagomez is taking new patients?

## 2024-07-18 ENCOUNTER — OFFICE VISIT (OUTPATIENT)
Dept: FAMILY MEDICINE CLINIC | Age: 74
End: 2024-07-18
Payer: COMMERCIAL

## 2024-07-18 VITALS
HEART RATE: 74 BPM | SYSTOLIC BLOOD PRESSURE: 132 MMHG | TEMPERATURE: 98 F | BODY MASS INDEX: 29.21 KG/M2 | OXYGEN SATURATION: 98 % | DIASTOLIC BLOOD PRESSURE: 66 MMHG | RESPIRATION RATE: 16 BRPM | WEIGHT: 181 LBS

## 2024-07-18 DIAGNOSIS — S29.012A STRAIN OF THORACIC BACK REGION: Primary | ICD-10-CM

## 2024-07-18 PROCEDURE — 99213 OFFICE O/P EST LOW 20 MIN: CPT

## 2024-07-18 PROCEDURE — 3078F DIAST BP <80 MM HG: CPT

## 2024-07-18 PROCEDURE — 1123F ACP DISCUSS/DSCN MKR DOCD: CPT

## 2024-07-18 PROCEDURE — 3075F SYST BP GE 130 - 139MM HG: CPT

## 2024-07-18 SDOH — ECONOMIC STABILITY: FOOD INSECURITY: WITHIN THE PAST 12 MONTHS, THE FOOD YOU BOUGHT JUST DIDN'T LAST AND YOU DIDN'T HAVE MONEY TO GET MORE.: NEVER TRUE

## 2024-07-18 SDOH — ECONOMIC STABILITY: INCOME INSECURITY: HOW HARD IS IT FOR YOU TO PAY FOR THE VERY BASICS LIKE FOOD, HOUSING, MEDICAL CARE, AND HEATING?: NOT HARD AT ALL

## 2024-07-18 SDOH — ECONOMIC STABILITY: FOOD INSECURITY: WITHIN THE PAST 12 MONTHS, YOU WORRIED THAT YOUR FOOD WOULD RUN OUT BEFORE YOU GOT MONEY TO BUY MORE.: NEVER TRUE

## 2024-07-18 ASSESSMENT — ENCOUNTER SYMPTOMS
NAUSEA: 0
CONSTIPATION: 0
SHORTNESS OF BREATH: 0
BACK PAIN: 1
WHEEZING: 0
COUGH: 0
RHINORRHEA: 0
DIARRHEA: 0
ABDOMINAL PAIN: 0
SORE THROAT: 0

## 2024-07-18 NOTE — PROGRESS NOTES
[facility]  [address]   [facility] [address]  Dept: [phone]  Loc: [phone]     Trinidad Smart (:  1950) is a 74 y.o. female, here for evaluation of the following chief complaint(s):  Lower Back Pain (Back pain x 7 days started after exercise class last week , located in the right  flank gets worse with movement )      ASSESSMENT/PLAN:  1. Strain of thoracic back region    Likely muscle strain.  At home treatments discussed.  Use heat therapy.  Light stretches. Gradual return to exercise class.  Avoid heavy lifting or twisting.   Rest.  Tylenol or ibuprofen for pain. Follow up if worsens.     Discussed use, benefit, and side effects of prescribed medications.  Barriers to medication compliance addressed.  All patient questions answered.  Pt voiced understanding.     Return for As needed or if symptoms don't improve.    SUBJECTIVE/OBJECTIVE:  HPI    Patient here today for concerns of lower/mid back pain. Pain on left side. Worse with certain movements. Has been doing exercise class at the . New instructor last week. She did different exercises at more intensity. Pain started that night after the class. She denies any numbness or tingling. No weakness. No dysuria or urinary frequency. Reaching up and to the right makes pain worse. Twisting motions make pain worse. Normal ROM. Pain is tolerable.     Past Medical History:   Diagnosis Date    Allergic rhinitis 1982    Allergies     Aortic stenosis     Cancer (HCC)     breast stage 1; status post lumpectomy, chemo, and radiation in     Hyperlipidemia     Hyperparathyroidism (HCC)     Hypertension     Osteopenia of multiple sites     Skin cancer, basal cell         Past Surgical History:   Procedure Laterality Date    BREAST SURGERY Left     HYSTERECTOMY, TOTAL ABDOMINAL (CERVIX REMOVED)  2008    HYSTERECTOMY, VAGINAL      LEEP         Social History

## 2024-08-27 ENCOUNTER — OFFICE VISIT (OUTPATIENT)
Dept: FAMILY MEDICINE CLINIC | Age: 74
End: 2024-08-27
Payer: COMMERCIAL

## 2024-08-27 VITALS
DIASTOLIC BLOOD PRESSURE: 78 MMHG | HEART RATE: 74 BPM | WEIGHT: 182.6 LBS | BODY MASS INDEX: 29.35 KG/M2 | SYSTOLIC BLOOD PRESSURE: 138 MMHG | HEIGHT: 66 IN | OXYGEN SATURATION: 98 %

## 2024-08-27 DIAGNOSIS — K62.5 RECTAL BLEEDING: ICD-10-CM

## 2024-08-27 DIAGNOSIS — R10.32 LEFT LOWER QUADRANT ABDOMINAL PAIN: Primary | ICD-10-CM

## 2024-08-27 PROCEDURE — 99214 OFFICE O/P EST MOD 30 MIN: CPT | Performed by: FAMILY MEDICINE

## 2024-08-27 PROCEDURE — 1123F ACP DISCUSS/DSCN MKR DOCD: CPT | Performed by: FAMILY MEDICINE

## 2024-08-27 PROCEDURE — 3075F SYST BP GE 130 - 139MM HG: CPT | Performed by: FAMILY MEDICINE

## 2024-08-27 PROCEDURE — 3078F DIAST BP <80 MM HG: CPT | Performed by: FAMILY MEDICINE

## 2024-08-27 ASSESSMENT — ENCOUNTER SYMPTOMS
RECTAL PAIN: 1
BLOOD IN STOOL: 1
COUGH: 0
SORE THROAT: 0
VOMITING: 0
TROUBLE SWALLOWING: 0
ABDOMINAL PAIN: 0
DIARRHEA: 0
CONSTIPATION: 0
SHORTNESS OF BREATH: 0

## 2024-08-27 NOTE — PROGRESS NOTES
SUBJECTIVE     Trinidad Smart is a 74 y.o.female      Pt complains of hemorrhoids, discomfort and ocas blood for approx 2 months. Pt has not tried any treatments. Blood is small amt with wiping.    Some leakage of stools.  Having 2-3 Bms per day.  Soft BM  Did have some diarrhea earlier this summer that resolved with dietary changes  No abdominal pain  Last colonoscopy was 7 years ago in [place]- Dr. Senior    Review of Systems   Constitutional:  Negative for appetite change, fatigue, fever and unexpected weight change.   HENT:  Negative for congestion, ear pain, sore throat and trouble swallowing.    Eyes:  Negative for visual disturbance.   Respiratory:  Negative for cough and shortness of breath.    Cardiovascular:  Negative for chest pain, palpitations and leg swelling.   Gastrointestinal:  Positive for blood in stool and rectal pain. Negative for abdominal pain, constipation, diarrhea and vomiting.   Genitourinary:  Negative for dysuria and frequency.   Musculoskeletal:  Negative for arthralgias and myalgias.   Skin:  Negative for rash.   Neurological:  Negative for dizziness.           OBJECTIVE     /78 (Site: Right Upper Arm, Position: Sitting, Cuff Size: Large Adult)   Pulse 74   Ht 1.676 m (5' 6\")   Wt 82.8 kg (182 lb 9.6 oz)   SpO2 98%   BMI 29.47 kg/m²     Physical Exam  Vitals and nursing note reviewed.   Constitutional:       General: She is not in acute distress.     Appearance: Normal appearance. She is normal weight. She is not ill-appearing.   HENT:      Head: Normocephalic and atraumatic.   Cardiovascular:      Rate and Rhythm: Normal rate and regular rhythm.      Pulses: Normal pulses.      Heart sounds: No murmur heard.  Pulmonary:      Effort: Pulmonary effort is normal. No respiratory distress.      Breath sounds: Normal breath sounds. No wheezing.   Abdominal:      General: Abdomen is flat. Bowel sounds are normal. There is no distension.      Palpations: Abdomen is soft.  There is no mass.      Tenderness: There is abdominal tenderness in the left lower quadrant. There is no guarding or rebound.   Genitourinary:     Rectum: No mass, anal fissure or external hemorrhoid. Normal anal tone.   Musculoskeletal:         General: No swelling. Normal range of motion.      Cervical back: Normal range of motion. No tenderness.      Right lower leg: No edema.      Left lower leg: No edema.   Lymphadenopathy:      Cervical: No cervical adenopathy.   Skin:     General: Skin is warm.      Findings: No rash.   Neurological:      General: No focal deficit present.      Mental Status: She is alert.   Psychiatric:         Mood and Affect: Mood normal.           No results found for this visit on 08/27/24.        ASSESSMENT       Diagnosis Orders   1. Left lower quadrant abdominal pain  CT ABDOMEN PELVIS W IV CONTRAST Additional Contrast? None    External Referral To General Surgery      2. Rectal bleeding  CT ABDOMEN PELVIS W IV CONTRAST Additional Contrast? None    External Referral To General Surgery          PLAN     1. Left lower quadrant abdominal pain  Recommend CT scan given exam findings of LLQ ttp as well as mild RLQ ttp in the absence of an identified source of bleeding upon rectal examination  She has had recurrent bleeding and thus also referred to review need for possible repeat colonoscopy to better evaluate internal anatomy.  - CT ABDOMEN PELVIS W IV CONTRAST Additional Contrast? None; Future  - External Referral To General Surgery    2. Rectal bleeding  - CT ABDOMEN PELVIS W IV CONTRAST Additional Contrast? None; Future  - External Referral To General Surgery          Electronically signed by Katelyn Ann Leopold, MD on 8/28/2024 at 11:24 AM

## 2024-09-04 ENCOUNTER — HOSPITAL ENCOUNTER (OUTPATIENT)
Dept: CT IMAGING | Age: 74
Discharge: HOME | End: 2024-09-04
Payer: COMMERCIAL

## 2024-09-04 DIAGNOSIS — K62.5 RECTAL BLEEDING: ICD-10-CM

## 2024-09-04 DIAGNOSIS — R10.32 LEFT LOWER QUADRANT ABDOMINAL PAIN: ICD-10-CM

## 2024-09-04 LAB
CREAT BLD-MCNC: 0.6 MG/DL (ref 0.5–1.2)
GFR SERPL CREATININE-BSD FRML MDRD: > 90 ML/MIN/1.73M2

## 2024-09-04 PROCEDURE — 6360000004 HC RX CONTRAST MEDICATION: Performed by: FAMILY MEDICINE

## 2024-09-04 PROCEDURE — 82565 ASSAY OF CREATININE: CPT

## 2024-09-04 PROCEDURE — 74177 CT ABD & PELVIS W/CONTRAST: CPT

## 2024-09-04 RX ORDER — IOPAMIDOL 755 MG/ML
100 INJECTION, SOLUTION INTRAVASCULAR
Status: COMPLETED | OUTPATIENT
Start: 2024-09-04 | End: 2024-09-04

## 2024-09-04 RX ADMIN — IOPAMIDOL 100 ML: 755 INJECTION, SOLUTION INTRAVENOUS at 07:57

## 2024-09-04 NOTE — RESULT ENCOUNTER NOTE
Please let Trinidad know I reviewed her CT scan and it looks ok overall- there is not a finding that suggests a serious reason for her bleeding- does she have a GI appointment to discuss a scope?  There is an incidentally noted small area on the liver which would benefit from another type of image- MRI of the abdomen to look at the liver.  If she is willing to do this I can order at [facility].

## 2024-09-23 ENCOUNTER — HOSPITAL ENCOUNTER (OUTPATIENT)
Dept: MRI IMAGING | Age: 74
Discharge: HOME | End: 2024-09-23
Payer: COMMERCIAL

## 2024-09-23 DIAGNOSIS — K76.9 LIVER LESION: ICD-10-CM

## 2024-09-23 DIAGNOSIS — R16.0 LIVER MASS: Primary | ICD-10-CM

## 2024-09-23 PROCEDURE — 74183 MRI ABD W/O CNTR FLWD CNTR: CPT

## 2024-09-23 PROCEDURE — 6360000004 HC RX CONTRAST MEDICATION: Performed by: FAMILY MEDICINE

## 2024-09-23 PROCEDURE — A9579 GAD-BASE MR CONTRAST NOS,1ML: HCPCS | Performed by: FAMILY MEDICINE

## 2024-09-23 RX ADMIN — GADOTERIDOL 20 ML: 279.3 INJECTION, SOLUTION INTRAVENOUS at 08:31

## 2024-09-26 ENCOUNTER — LAB (OUTPATIENT)
Dept: LAB | Age: 74
End: 2024-09-26

## 2024-09-26 ENCOUNTER — TELEPHONE (OUTPATIENT)
Dept: FAMILY MEDICINE CLINIC | Age: 74
End: 2024-09-26

## 2024-09-26 DIAGNOSIS — I10 PRIMARY HYPERTENSION: ICD-10-CM

## 2024-09-26 DIAGNOSIS — R16.0 LIVER MASS: ICD-10-CM

## 2024-09-26 DIAGNOSIS — K76.9 LIVER LESION: Primary | ICD-10-CM

## 2024-09-26 DIAGNOSIS — E78.5 HYPERLIPIDEMIA, UNSPECIFIED HYPERLIPIDEMIA TYPE: ICD-10-CM

## 2024-09-26 LAB
ALBUMIN SERPL BCG-MCNC: 4.3 G/DL (ref 3.5–5.1)
ALP SERPL-CCNC: 166 U/L (ref 38–126)
ALT SERPL W/O P-5'-P-CCNC: 14 U/L (ref 11–66)
ANION GAP SERPL CALC-SCNC: 11 MEQ/L (ref 8–16)
AST SERPL-CCNC: 15 U/L (ref 5–40)
BASOPHILS ABSOLUTE: 0.1 THOU/MM3 (ref 0–0.1)
BASOPHILS NFR BLD AUTO: 0.7 %
BILIRUB SERPL-MCNC: 0.6 MG/DL (ref 0.3–1.2)
BUN SERPL-MCNC: 12 MG/DL (ref 7–22)
CALCIUM SERPL-MCNC: 10.3 MG/DL (ref 8.5–10.5)
CHLORIDE SERPL-SCNC: 99 MEQ/L (ref 98–111)
CHOLEST SERPL-MCNC: 176 MG/DL (ref 100–199)
CO2 SERPL-SCNC: 28 MEQ/L (ref 23–33)
CREAT SERPL-MCNC: 0.6 MG/DL (ref 0.4–1.2)
DEPRECATED MEAN GLUCOSE BLD GHB EST-ACNC: 120 MG/DL (ref 70–126)
DEPRECATED RDW RBC AUTO: 41.5 FL (ref 35–45)
EOSINOPHIL NFR BLD AUTO: 4.5 %
EOSINOPHILS ABSOLUTE: 0.3 THOU/MM3 (ref 0–0.4)
ERYTHROCYTE [DISTWIDTH] IN BLOOD BY AUTOMATED COUNT: 11.9 % (ref 11.5–14.5)
GFR SERPL CREATININE-BSD FRML MDRD: > 90 ML/MIN/1.73M2
GLUCOSE SERPL-MCNC: 109 MG/DL (ref 70–108)
HBA1C MFR BLD HPLC: 6 % (ref 4.4–6.4)
HCT VFR BLD AUTO: 41.6 % (ref 37–47)
HDLC SERPL-MCNC: 51 MG/DL
HGB BLD-MCNC: 13.7 GM/DL (ref 12–16)
IMM GRANULOCYTES # BLD AUTO: 0.01 THOU/MM3 (ref 0–0.07)
IMM GRANULOCYTES NFR BLD AUTO: 0.1 %
LDLC SERPL CALC-MCNC: 100 MG/DL
LYMPHOCYTES ABSOLUTE: 2.4 THOU/MM3 (ref 1–4.8)
LYMPHOCYTES NFR BLD AUTO: 32.3 %
MCH RBC QN AUTO: 31.6 PG (ref 26–33)
MCHC RBC AUTO-ENTMCNC: 32.9 GM/DL (ref 32.2–35.5)
MCV RBC AUTO: 95.9 FL (ref 81–99)
MONOCYTES ABSOLUTE: 0.7 THOU/MM3 (ref 0.4–1.3)
MONOCYTES NFR BLD AUTO: 9.9 %
NEUTROPHILS ABSOLUTE: 3.9 THOU/MM3 (ref 1.8–7.7)
NEUTROPHILS NFR BLD AUTO: 52.5 %
NRBC BLD AUTO-RTO: 0 /100 WBC
PLATELET # BLD AUTO: 285 THOU/MM3 (ref 130–400)
PMV BLD AUTO: 9.8 FL (ref 9.4–12.4)
POTASSIUM SERPL-SCNC: 4.2 MEQ/L (ref 3.5–5.2)
PROT SERPL-MCNC: 7.3 G/DL (ref 6.1–8)
RBC # BLD AUTO: 4.34 MILL/MM3 (ref 4.2–5.4)
SODIUM SERPL-SCNC: 138 MEQ/L (ref 135–145)
TRIGL SERPL-MCNC: 123 MG/DL (ref 0–199)
WBC # BLD AUTO: 7.5 THOU/MM3 (ref 4.8–10.8)

## 2024-09-26 NOTE — TELEPHONE ENCOUNTER
Called IR scheduling to schedule Liver Biopsy. Per  wrong order was placed. Needs to be CT guided needle placement. She also states she has to have radiologist review the MRI first prior to scheduling. Per patient she would like to have scheduled on a Friday. Pended order for you to review.

## 2024-10-14 ENCOUNTER — PATIENT MESSAGE (OUTPATIENT)
Dept: FAMILY MEDICINE CLINIC | Age: 74
End: 2024-10-14

## 2024-10-17 ENCOUNTER — HOSPITAL ENCOUNTER (OUTPATIENT)
Dept: CT IMAGING | Age: 74
Discharge: HOME | End: 2024-10-17
Payer: COMMERCIAL

## 2024-10-17 VITALS
RESPIRATION RATE: 15 BRPM | WEIGHT: 180 LBS | OXYGEN SATURATION: 95 % | HEART RATE: 58 BPM | TEMPERATURE: 97.7 F | DIASTOLIC BLOOD PRESSURE: 62 MMHG | SYSTOLIC BLOOD PRESSURE: 129 MMHG | BODY MASS INDEX: 28.93 KG/M2 | HEIGHT: 66 IN

## 2024-10-17 DIAGNOSIS — R16.0 LIVER MASS: ICD-10-CM

## 2024-10-17 LAB
CREAT SERPL-MCNC: 0.6 MG/DL (ref 0.4–1.2)
DEPRECATED RDW RBC AUTO: 41.1 FL (ref 35–45)
ERYTHROCYTE [DISTWIDTH] IN BLOOD BY AUTOMATED COUNT: 11.8 % (ref 11.5–14.5)
GFR SERPL CREATININE-BSD FRML MDRD: > 90 ML/MIN/1.73M2
HCT VFR BLD AUTO: 42.7 % (ref 37–47)
HGB BLD-MCNC: 13.8 GM/DL (ref 12–16)
MCH RBC QN AUTO: 31 PG (ref 26–33)
MCHC RBC AUTO-ENTMCNC: 32.3 GM/DL (ref 32.2–35.5)
MCV RBC AUTO: 96 FL (ref 81–99)
PLATELET # BLD AUTO: 274 THOU/MM3 (ref 130–400)
PMV BLD AUTO: 9.4 FL (ref 9.4–12.4)
RBC # BLD AUTO: 4.45 MILL/MM3 (ref 4.2–5.4)
WBC # BLD AUTO: 7.9 THOU/MM3 (ref 4.8–10.8)

## 2024-10-17 PROCEDURE — 85027 COMPLETE CBC AUTOMATED: CPT

## 2024-10-17 PROCEDURE — 2580000003 HC RX 258: Performed by: RADIOLOGY

## 2024-10-17 PROCEDURE — 88334 PATH CONSLTJ SURG CYTO XM EA: CPT

## 2024-10-17 PROCEDURE — 6370000000 HC RX 637 (ALT 250 FOR IP): Performed by: RADIOLOGY

## 2024-10-17 PROCEDURE — 77012 CT SCAN FOR NEEDLE BIOPSY: CPT

## 2024-10-17 PROCEDURE — 6360000002 HC RX W HCPCS: Performed by: RADIOLOGY

## 2024-10-17 PROCEDURE — 99211 OFF/OP EST MAY X REQ PHY/QHP: CPT

## 2024-10-17 PROCEDURE — 88307 TISSUE EXAM BY PATHOLOGIST: CPT

## 2024-10-17 PROCEDURE — 36415 COLL VENOUS BLD VENIPUNCTURE: CPT

## 2024-10-17 PROCEDURE — 88333 PATH CONSLTJ SURG CYTO XM 1: CPT

## 2024-10-17 PROCEDURE — 82565 ASSAY OF CREATININE: CPT

## 2024-10-17 RX ORDER — FENTANYL CITRATE 50 UG/ML
50 INJECTION, SOLUTION INTRAMUSCULAR; INTRAVENOUS ONCE
Status: COMPLETED | OUTPATIENT
Start: 2024-10-17 | End: 2024-10-17

## 2024-10-17 RX ORDER — SODIUM CHLORIDE 450 MG/100ML
INJECTION, SOLUTION INTRAVENOUS CONTINUOUS
Status: DISCONTINUED | OUTPATIENT
Start: 2024-10-17 | End: 2024-10-18 | Stop reason: HOSPADM

## 2024-10-17 RX ORDER — MIDAZOLAM HYDROCHLORIDE 1 MG/ML
INJECTION INTRAMUSCULAR; INTRAVENOUS PRN
Status: COMPLETED | OUTPATIENT
Start: 2024-10-17 | End: 2024-10-17

## 2024-10-17 RX ORDER — FENTANYL CITRATE 50 UG/ML
INJECTION, SOLUTION INTRAMUSCULAR; INTRAVENOUS PRN
Status: COMPLETED | OUTPATIENT
Start: 2024-10-17 | End: 2024-10-17

## 2024-10-17 RX ORDER — MIDAZOLAM HYDROCHLORIDE 1 MG/ML
1 INJECTION INTRAMUSCULAR; INTRAVENOUS ONCE
Status: COMPLETED | OUTPATIENT
Start: 2024-10-17 | End: 2024-10-17

## 2024-10-17 RX ORDER — HYDROCODONE BITARTRATE AND ACETAMINOPHEN 5; 325 MG/1; MG/1
1 TABLET ORAL ONCE
Status: COMPLETED | OUTPATIENT
Start: 2024-10-17 | End: 2024-10-17

## 2024-10-17 RX ADMIN — HYDROCODONE BITARTRATE AND ACETAMINOPHEN 1 TABLET: 5; 325 TABLET ORAL at 11:28

## 2024-10-17 RX ADMIN — MIDAZOLAM 1 MG: 1 INJECTION INTRAMUSCULAR; INTRAVENOUS at 09:54

## 2024-10-17 RX ADMIN — MIDAZOLAM 1 MG: 1 INJECTION INTRAMUSCULAR; INTRAVENOUS at 09:50

## 2024-10-17 RX ADMIN — FENTANYL CITRATE 50 MCG: 50 INJECTION, SOLUTION INTRAMUSCULAR; INTRAVENOUS at 09:50

## 2024-10-17 RX ADMIN — SODIUM CHLORIDE: 4.5 INJECTION, SOLUTION INTRAVENOUS at 09:05

## 2024-10-17 RX ADMIN — FENTANYL CITRATE 50 MCG: 50 INJECTION, SOLUTION INTRAMUSCULAR; INTRAVENOUS at 09:54

## 2024-10-17 ASSESSMENT — PAIN SCALES - GENERAL
PAINLEVEL_OUTOF10: 5
PAINLEVEL_OUTOF10: 4
PAINLEVEL_OUTOF10: 5
PAINLEVEL_OUTOF10: 2

## 2024-10-17 ASSESSMENT — PAIN DESCRIPTION - DESCRIPTORS
DESCRIPTORS: DISCOMFORT

## 2024-10-17 ASSESSMENT — PAIN DESCRIPTION - PAIN TYPE
TYPE: SURGICAL PAIN

## 2024-10-17 ASSESSMENT — PAIN DESCRIPTION - ORIENTATION
ORIENTATION: RIGHT

## 2024-10-17 ASSESSMENT — PAIN DESCRIPTION - LOCATION
LOCATION: ABDOMEN

## 2024-10-17 NOTE — OP NOTE
Department of Radiology  Post Procedure Progress Note      Pre-Procedure Diagnosis:  Liver lesion    Procedure Performed:  CT guided biopsy    Anesthesia: local / versed and fentanyl    Findings: successful    Immediate Complications:  None    Estimated Blood Loss: minimal    SEE DICTATED PROCEDURE NOTE FOR COMPLETE DETAILS.    Electronically signed by Antonio Doherty MD on 10/17/2024 at 10:30 AM

## 2024-10-17 NOTE — PROGRESS NOTES
0917 Pt in CT imaging for CT guided liver biopsy. Discussed procedure with pt and pt verbalizes understanding.  0983 Report to Fredy MCKOY.

## 2024-10-17 NOTE — PROGRESS NOTES
0830 Patient ambulatory to OPN for Liver biopsy. Patient denies any blood thinners. Confirms NPO and sister at bedside. PT RIGHTS AND RESPONSIBILITIES OFFERED TO PT.    0915 Patient to IR for procedure.     1043 Patient back to room post procedure. Band aid to right side dry and intact. Patient reports discomfort 2/10.     1045 Patient resting in bed. Band aid to right side dry and intact. Patient reports pain 4/10.     1100 Patient resting in bed. Band aid to right side dry and intact. Patient reports pain 5/10 to right side. Warm blanket applied.     1115 Patient reports no relief with warm blanket. Band aid to right side dry and intact. Area soft. Offered patient pain pill. Patient states \"yes\" to pain medication. Called Dr. Doherty to update. New order received for [place].       1128 Norco given to patient.     1145 Patient resting in bed. Band aid to right side dry and intact.     1215 Patient resting in bed. Reports improvement of pain. Band aid to right side dry and intact.     1315 Patient resting in bed. Denies any changes in pain. Band aid to right side dry and intact.     1415 Patient resting in bed. Band aid to right side dry and intact. Patient up to bathroom. States she feels good to go home. AVS Reviewed with patient.     1430 Patient discharged in wheelchair to [facility].             _M___ Safety:       (Environmental)  [place] to environment  Ensure ID band is correct and in place/ allergy band as needed  Assess for fall risk  Initiate fall precautions as applicable (fall band, side rails, etc.)  Call light within reach  Bed in low position/ wheels locked    _M___ Pain:       Assess pain level and characteristics  Administer analgesics as ordered  Assess effectiveness of pain management and report to MD as needed    _M___ Knowledge Deficit:  Assess baseline knowledge  Provide teaching at level of understanding  Provide teaching via preferred learning method  Evaluate teaching effectiveness    _M___

## 2024-10-17 NOTE — PROGRESS NOTES
0942 Patient received in CT scanning for CT Liver biopsy pre-procedure assessment with family at bedside. Monitor applied.  Dr. Doherty here; spoke to patient. This procedure has been fully reviewed with the patient and written informed consent has been obtained.   0947 Patient assisted to CT table and pre scan started. Pathology arrived to CT.   0952 Procedure started with Dr. Doherty.  [id number] Samples collected and given to pathologist for further review.   1025 Procedure completed; patient tolerated well. Post scan obtained.   1027 Bacitracin oint, band aid to site; no bleeding noted.  1033 Patient on cart; comfort ensured.  1034 Report called to OPN and patient taken to OPN via cart with family at bedside. Pt alert and oriented x3; follows commands. Skin pink, warm, and dry. Respirations easy, regular, and nonlabored.

## 2024-10-17 NOTE — H&P
MG CAPS, Take by mouth nightly as needed (Patient not taking: Reported on 7/18/2024), Disp: , Rfl:     fluticasone (FLONASE) 50 MCG/ACT nasal spray, 2 sprays by Each Nostril route daily, Disp: , Rfl:     guaiFENesin (MUCUS RELIEF ADULT PO), Take 1,200 mg by mouth in the morning and at bedtime, Disp: , Rfl:     Current Facility-Administered Medications:     0.45 % sodium chloride infusion, , IntraVENous, Continuous, Antonio Doherty MD, Last Rate: 20 mL/hr at 10/17/24 0905, New Bag at 10/17/24 0905    fentaNYL (SUBLIMAZE) injection 50 mcg, 50 mcg, IntraVENous, Once, Antonio Doherty MD    midazolam (VERSED) injection 1 mg, 1 mg, IntraVENous, Once, Antonio Doherty MD  Prior to Admission medications    Medication Sig Start Date End Date Taking? Authorizing Provider   amLODIPine (NORVASC) 10 MG tablet Take 1 tablet by mouth daily 3/26/24   Leopold, Katelyn Ann, MD   losartan (COZAAR) 100 MG tablet Take 1 tablet by mouth daily 3/26/24   Leopold, Katelyn Ann, MD   metoprolol succinate (TOPROL XL) 200 MG extended release tablet Take 1 tablet by mouth daily 3/26/24   Leopold, Katelyn Ann, MD   simvastatin (ZOCOR) 20 MG tablet Take 1 tablet by mouth nightly 3/26/24   Leopold, Katelyn Ann, MD   AZELASTINE HCL OP     ProviderDarleen MD   Cholecalciferol (VITAMIN D3) 125 MCG (5000 UT) TABS Take by mouth daily    Darleen Muhammad MD   Melatonin 5 MG CAPS Take by mouth nightly as needed  Patient not taking: Reported on 7/18/2024    Darleen Muhammad MD   fluticasone (FLONASE) 50 MCG/ACT nasal spray 2 sprays by Each Nostril route daily    Darleen Muhammad MD   guaiFENesin (MUCUS RELIEF ADULT PO) Take 1,200 mg by mouth in the morning and at bedtime    ProviderDarleen MD     Additional information:       VITAL SIGNS   Vitals:    10/17/24 0848   BP: (!) 143/63   Pulse: 65   Resp: 18   Temp: 97.7 °F (36.5 °C)   SpO2: 98%       PHYSICAL:   Heart:  [x]Regular rate and rhythm

## 2024-10-18 ENCOUNTER — TELEPHONE (OUTPATIENT)
Dept: INTERVENTIONAL RADIOLOGY/VASCULAR | Age: 74
End: 2024-10-18

## 2024-10-18 NOTE — TELEPHONE ENCOUNTER
10/18/24 at 1352 This RN attempted to call the patient for follow up phone call for procedure. No answer. Message left.

## 2024-10-29 ENCOUNTER — OFFICE VISIT (OUTPATIENT)
Dept: FAMILY MEDICINE CLINIC | Age: 74
End: 2024-10-29
Payer: COMMERCIAL

## 2024-10-29 VITALS
BODY MASS INDEX: 29.06 KG/M2 | SYSTOLIC BLOOD PRESSURE: 122 MMHG | HEIGHT: 66 IN | WEIGHT: 180.8 LBS | HEART RATE: 72 BPM | DIASTOLIC BLOOD PRESSURE: 66 MMHG | OXYGEN SATURATION: 96 %

## 2024-10-29 DIAGNOSIS — R16.0 LIVER MASS: Primary | ICD-10-CM

## 2024-10-29 PROCEDURE — 99213 OFFICE O/P EST LOW 20 MIN: CPT | Performed by: FAMILY MEDICINE

## 2024-10-29 PROCEDURE — 3074F SYST BP LT 130 MM HG: CPT | Performed by: FAMILY MEDICINE

## 2024-10-29 PROCEDURE — 1123F ACP DISCUSS/DSCN MKR DOCD: CPT | Performed by: FAMILY MEDICINE

## 2024-10-29 PROCEDURE — 3078F DIAST BP <80 MM HG: CPT | Performed by: FAMILY MEDICINE

## 2024-10-29 ASSESSMENT — ENCOUNTER SYMPTOMS
DIARRHEA: 0
SHORTNESS OF BREATH: 0
CONSTIPATION: 0
SORE THROAT: 0
COUGH: 0
ABDOMINAL PAIN: 0
VOMITING: 0
TROUBLE SWALLOWING: 0

## 2024-10-29 NOTE — PROGRESS NOTES
Neurological:      General: No focal deficit present.      Mental Status: She is alert.         No results found for this visit on 10/29/24.    Lab Results   Component Value Date    LABA1C 6.0 09/26/2024       Lab Results   Component Value Date    CHOL 176 09/26/2024    TRIG 123 09/26/2024    HDL 51 09/26/2024       The 10-year ASCVD risk score (James HOWELL, et al., 2019) is: 21.1%    Values used to calculate the score:      Age: 74 years      Sex: Female      Is Non- : No      Diabetic: No      Tobacco smoker: No      Systolic Blood Pressure: 122 mmHg      Is BP treated: Yes      HDL Cholesterol: 51 mg/dL      Total Cholesterol: 176 mg/dL  Results  Laboratory Studies  Biopsy showed no evidence of cancer. Blood sugar was a little high. Cholesterol levels were excellent. Liver function was okay, although one enzyme was slightly elevated. Blood counts were good, no sign of anemia.    Imaging  MRI results were concerning.      Lab Results   Component Value Date     09/26/2024    K 4.2 09/26/2024    CL 99 09/26/2024    CO2 28 09/26/2024    BUN 12 09/26/2024    CREATININE 0.6 10/17/2024    GLUCOSE 109 (H) 09/26/2024    CALCIUM 10.3 09/26/2024    BILITOT 0.6 09/26/2024    ALKPHOS 166 (H) 09/26/2024    AST 15 09/26/2024    ALT 14 09/26/2024    LABGLOM > 90 10/17/2024     estimated creatinine clearance is 86 mL/min (based on SCr of 0.6 mg/dL).     No results found for: \"IORP24XMP\"    Lab Results   Component Value Date    TSH 4.110 08/10/2023       Lab Results   Component Value Date    WBC 7.9 10/17/2024    HGB 13.8 10/17/2024    HCT 42.7 10/17/2024    MCV 96.0 10/17/2024     10/17/2024       No results found for: \"PSA\"    Immunization History   Administered Date(s) Administered    COVID-19, MODERNA, (age 12y+), IM, 50mcg/0.5mL 09/24/2023    COVID-19, PFIZER Bivalent, DO NOT Dilute, (age 12y+), IM, 30 mcg/0.3 mL 10/17/2022    COVID-19, PFIZER GRAY top, DO NOT Dilute, (age 12 y+), IM, 30

## 2024-12-05 ENCOUNTER — PATIENT MESSAGE (OUTPATIENT)
Dept: FAMILY MEDICINE CLINIC | Age: 74
End: 2024-12-05

## 2025-01-02 ENCOUNTER — TELEPHONE (OUTPATIENT)
Dept: FAMILY MEDICINE CLINIC | Age: 75
End: 2025-01-02

## 2025-01-02 DIAGNOSIS — C50.911: Primary | ICD-10-CM

## 2025-01-02 NOTE — TELEPHONE ENCOUNTER
R Breast biopsy recently performed at [facility] on 12/30/24 and patient was told today it is lobular carcinoma- small size (4 mm on imaging).  Prior cancer L breast was treated 24 years ago in [place] pt was told it was ER +, completed treatment with Arimidex.  Pt expressed desire for treatment in [place]- referral will be made for General surgery and Heme/Onc. Offered emotional support, pt is coping ok, she has a sister recently treated for breast cancer as well.

## 2025-01-02 NOTE — TELEPHONE ENCOUNTER
Patient called and states she has gotten a phone call stating that she has breast cancer. They are waiting on final results but was told to contact her PCP to get a referral. Pt had testing done at [facility]. I called for results and have not received them yet. Pt would like to know if you want to see her first or if you can do a referral to [facility] for treatments.

## 2025-01-06 ENCOUNTER — HOSPITAL ENCOUNTER (OUTPATIENT)
Dept: WOMENS IMAGING | Age: 75
Discharge: HOME | End: 2025-01-06
Attending: RADIOLOGY

## 2025-01-06 DIAGNOSIS — Z00.6 ENCOUNTER FOR EXAMINATION FOR NORMAL COMPARISON OR CONTROL IN CLINICAL RESEARCH PROGRAM: ICD-10-CM

## 2025-01-07 ENCOUNTER — OFFICE VISIT (OUTPATIENT)
Dept: SURGERY | Age: 75
End: 2025-01-07
Payer: COMMERCIAL

## 2025-01-07 VITALS
BODY MASS INDEX: 28.09 KG/M2 | TEMPERATURE: 98 F | OXYGEN SATURATION: 98 % | HEIGHT: 66 IN | SYSTOLIC BLOOD PRESSURE: 128 MMHG | WEIGHT: 174.8 LBS | HEART RATE: 76 BPM | RESPIRATION RATE: 16 BRPM | DIASTOLIC BLOOD PRESSURE: 64 MMHG

## 2025-01-07 DIAGNOSIS — C50.411 MALIGNANT NEOPLASM OF UPPER-OUTER QUADRANT OF RIGHT BREAST IN FEMALE, ESTROGEN RECEPTOR POSITIVE (HCC): Primary | ICD-10-CM

## 2025-01-07 DIAGNOSIS — Z17.0 MALIGNANT NEOPLASM OF UPPER-OUTER QUADRANT OF RIGHT BREAST IN FEMALE, ESTROGEN RECEPTOR POSITIVE (HCC): Primary | ICD-10-CM

## 2025-01-07 PROCEDURE — 3074F SYST BP LT 130 MM HG: CPT | Performed by: SURGERY

## 2025-01-07 PROCEDURE — 99205 OFFICE O/P NEW HI 60 MIN: CPT | Performed by: SURGERY

## 2025-01-07 PROCEDURE — 1123F ACP DISCUSS/DSCN MKR DOCD: CPT | Performed by: SURGERY

## 2025-01-07 PROCEDURE — 3078F DIAST BP <80 MM HG: CPT | Performed by: SURGERY

## 2025-01-15 ENCOUNTER — HOSPITAL ENCOUNTER (OUTPATIENT)
Dept: MRI IMAGING | Age: 75
Discharge: HOME | End: 2025-01-15
Attending: SURGERY
Payer: COMMERCIAL

## 2025-01-15 DIAGNOSIS — Z17.0 MALIGNANT NEOPLASM OF UPPER-OUTER QUADRANT OF RIGHT BREAST IN FEMALE, ESTROGEN RECEPTOR POSITIVE (HCC): ICD-10-CM

## 2025-01-15 DIAGNOSIS — C50.411 MALIGNANT NEOPLASM OF UPPER-OUTER QUADRANT OF RIGHT BREAST IN FEMALE, ESTROGEN RECEPTOR POSITIVE (HCC): ICD-10-CM

## 2025-01-15 LAB
CREAT BLD-MCNC: 0.6 MG/DL (ref 0.5–1.2)
GFR SERPL CREATININE-BSD FRML MDRD: > 90 ML/MIN/1.73M2

## 2025-01-15 PROCEDURE — A9579 GAD-BASE MR CONTRAST NOS,1ML: HCPCS | Performed by: SURGERY

## 2025-01-15 PROCEDURE — 6360000004 HC RX CONTRAST MEDICATION: Performed by: SURGERY

## 2025-01-15 PROCEDURE — C8908 MRI W/O FOL W/CONT, BREAST,: HCPCS

## 2025-01-15 PROCEDURE — 82565 ASSAY OF CREATININE: CPT

## 2025-01-15 RX ADMIN — GADOTERIDOL 20 ML: 279.3 INJECTION, SOLUTION INTRAVENOUS at 09:50

## 2025-01-16 ENCOUNTER — OFFICE VISIT (OUTPATIENT)
Dept: SURGERY | Age: 75
End: 2025-01-16
Payer: COMMERCIAL

## 2025-01-16 ENCOUNTER — HOSPITAL ENCOUNTER (OUTPATIENT)
Dept: WOMENS IMAGING | Age: 75
Discharge: HOME | End: 2025-01-16
Attending: RADIOLOGY
Payer: COMMERCIAL

## 2025-01-16 VITALS
DIASTOLIC BLOOD PRESSURE: 64 MMHG | RESPIRATION RATE: 18 BRPM | TEMPERATURE: 97.1 F | OXYGEN SATURATION: 95 % | HEART RATE: 72 BPM | SYSTOLIC BLOOD PRESSURE: 128 MMHG

## 2025-01-16 DIAGNOSIS — Z17.0 MALIGNANT NEOPLASM OF UPPER-OUTER QUADRANT OF RIGHT BREAST IN FEMALE, ESTROGEN RECEPTOR POSITIVE (HCC): ICD-10-CM

## 2025-01-16 DIAGNOSIS — R92.8 ABNORMAL MRI, BREAST: Primary | ICD-10-CM

## 2025-01-16 DIAGNOSIS — R92.8 ABNORMAL MRI, BREAST: ICD-10-CM

## 2025-01-16 DIAGNOSIS — C50.411 MALIGNANT NEOPLASM OF UPPER-OUTER QUADRANT OF RIGHT BREAST IN FEMALE, ESTROGEN RECEPTOR POSITIVE (HCC): ICD-10-CM

## 2025-01-16 PROCEDURE — 1123F ACP DISCUSS/DSCN MKR DOCD: CPT | Performed by: SURGERY

## 2025-01-16 PROCEDURE — 99213 OFFICE O/P EST LOW 20 MIN: CPT | Performed by: SURGERY

## 2025-01-16 PROCEDURE — 76642 ULTRASOUND BREAST LIMITED: CPT

## 2025-01-16 PROCEDURE — 3078F DIAST BP <80 MM HG: CPT | Performed by: SURGERY

## 2025-01-16 PROCEDURE — 3074F SYST BP LT 130 MM HG: CPT | Performed by: SURGERY

## 2025-01-16 RX ORDER — PENICILLIN V POTASSIUM 500 MG/1
TABLET, FILM COATED ORAL
COMMUNITY
Start: 2025-01-10

## 2025-01-17 PROBLEM — C50.411 MALIGNANT NEOPLASM OF UPPER-OUTER QUADRANT OF RIGHT BREAST IN FEMALE, ESTROGEN RECEPTOR POSITIVE (HCC): Status: ACTIVE | Noted: 2025-01-17

## 2025-01-17 PROBLEM — Z17.0 MALIGNANT NEOPLASM OF UPPER-OUTER QUADRANT OF RIGHT BREAST IN FEMALE, ESTROGEN RECEPTOR POSITIVE (HCC): Status: ACTIVE | Noted: 2025-01-17

## 2025-01-17 ASSESSMENT — ENCOUNTER SYMPTOMS: GASTROINTESTINAL NEGATIVE: 1

## 2025-01-17 NOTE — PROGRESS NOTES
independently.  Time was given for questions . All questions were answered to the patients satisfaction. A total time of 90 minutes  was spent with at least 51% related to counseling and coordination of care.

## 2025-01-22 ENCOUNTER — HOSPITAL ENCOUNTER (OUTPATIENT)
Dept: MRI IMAGING | Age: 75
Discharge: HOME | End: 2025-01-22
Attending: SURGERY
Payer: COMMERCIAL

## 2025-01-22 ENCOUNTER — HOSPITAL ENCOUNTER (OUTPATIENT)
Dept: WOMENS IMAGING | Age: 75
Discharge: HOME | End: 2025-01-22
Attending: RADIOLOGY
Payer: COMMERCIAL

## 2025-01-22 DIAGNOSIS — R92.8 ABNORMAL MRI, BREAST: ICD-10-CM

## 2025-01-22 PROCEDURE — 19085 BX BREAST 1ST LESION MR IMAG: CPT

## 2025-01-22 PROCEDURE — 6360000004 HC RX CONTRAST MEDICATION: Performed by: SURGERY

## 2025-01-22 PROCEDURE — 77065 DX MAMMO INCL CAD UNI: CPT

## 2025-01-22 PROCEDURE — A9579 GAD-BASE MR CONTRAST NOS,1ML: HCPCS | Performed by: SURGERY

## 2025-01-22 PROCEDURE — 88305 TISSUE EXAM BY PATHOLOGIST: CPT

## 2025-01-22 RX ADMIN — GADOTERIDOL 20 ML: 279.3 INJECTION, SOLUTION INTRAVENOUS at 14:52

## 2025-01-22 NOTE — PROGRESS NOTES
Breast Biopsy Flowsheet/Post-Operative Care    Date of Procedure: 1/22/2025  Physician: Dr. Ely  Technologist: Jo Mendez     Biopsy:MRI  Lesion type: Non-palpable  Breast: left    Clock face position: Site #1: central         Primary Method of Detection:  MRI      Microcalcification's: no   Distribution: N/A        Biopsy Method:   Encore    Site # 1    Gauge: 10    # of Passes: 5     Clip: Meghana    Pre-Op Assessment: (BI-RADS)   4. Suspicious Abnormality    Patient Tolerated Procedure: good  Complications: n/a  Comments: n/a    Post Operative Care  Steri strips: Yes  Dressing: Gauze, Tape Paper tape used - okay per patient  Ice Applied to Site:  No  Evidence of Bleeding:  No    Pain Verbalized: No      Written Discharge Instructions: Yes  Condition at Discharge: good  Time of Discharge: 1430    Electronically signed by Wandy Leyva on 1/22/2025 at 4:31 PM

## 2025-01-23 ENCOUNTER — OFFICE VISIT (OUTPATIENT)
Dept: ONCOLOGY | Age: 75
End: 2025-01-23
Payer: COMMERCIAL

## 2025-01-23 ENCOUNTER — CLINICAL DOCUMENTATION (OUTPATIENT)
Dept: CASE MANAGEMENT | Age: 75
End: 2025-01-23

## 2025-01-23 ENCOUNTER — HOSPITAL ENCOUNTER (OUTPATIENT)
Dept: INFUSION THERAPY | Age: 75
Discharge: HOME | End: 2025-01-23
Payer: COMMERCIAL

## 2025-01-23 VITALS
DIASTOLIC BLOOD PRESSURE: 64 MMHG | SYSTOLIC BLOOD PRESSURE: 134 MMHG | HEART RATE: 71 BPM | HEIGHT: 66 IN | WEIGHT: 182 LBS | RESPIRATION RATE: 18 BRPM | TEMPERATURE: 98 F | BODY MASS INDEX: 29.25 KG/M2 | OXYGEN SATURATION: 92 %

## 2025-01-23 VITALS
HEART RATE: 71 BPM | DIASTOLIC BLOOD PRESSURE: 64 MMHG | SYSTOLIC BLOOD PRESSURE: 134 MMHG | TEMPERATURE: 98 F | RESPIRATION RATE: 18 BRPM

## 2025-01-23 DIAGNOSIS — C50.411 MALIGNANT NEOPLASM OF UPPER-OUTER QUADRANT OF RIGHT BREAST IN FEMALE, ESTROGEN RECEPTOR POSITIVE (HCC): Primary | ICD-10-CM

## 2025-01-23 DIAGNOSIS — Z17.0 MALIGNANT NEOPLASM OF UPPER-OUTER QUADRANT OF RIGHT BREAST IN FEMALE, ESTROGEN RECEPTOR POSITIVE (HCC): Primary | ICD-10-CM

## 2025-01-23 PROCEDURE — 99211 OFF/OP EST MAY X REQ PHY/QHP: CPT

## 2025-01-23 PROCEDURE — 99205 OFFICE O/P NEW HI 60 MIN: CPT | Performed by: INTERNAL MEDICINE

## 2025-01-23 PROCEDURE — 3078F DIAST BP <80 MM HG: CPT | Performed by: INTERNAL MEDICINE

## 2025-01-23 PROCEDURE — 3075F SYST BP GE 130 - 139MM HG: CPT | Performed by: INTERNAL MEDICINE

## 2025-01-23 RX ORDER — LETROZOLE 2.5 MG/1
2.5 TABLET, FILM COATED ORAL DAILY
Qty: 30 TABLET | Refills: 3 | Status: CANCELLED | OUTPATIENT
Start: 2025-01-23

## 2025-01-23 ASSESSMENT — ENCOUNTER SYMPTOMS
SHORTNESS OF BREATH: 0
COUGH: 0
DIARRHEA: 0
ABDOMINAL PAIN: 0
CONSTIPATION: 0
TROUBLE SWALLOWING: 0
VOICE CHANGE: 0
BLOOD IN STOOL: 0
NAUSEA: 0
WHEEZING: 0
CHEST TIGHTNESS: 0
SORE THROAT: 0
VOMITING: 0
ABDOMINAL DISTENTION: 0

## 2025-01-23 NOTE — PROGRESS NOTES
[facility] [place] [facility]  [facility]  [address]  [address]  [place] [id number]  Dept: [phone]  Loc: [phone]   Hematology/Oncology Consult (Clinic)        1/23/25     Trinidad Smart   1950     Leopold, Katelyn Ann, MD Leopold, Katelyn Ann, MD       Reason:   Chief Complaint   Patient presents with    New Patient     Intraductal and lobular carcinoma, right          HPI:     74-year-old female patient who is coming today for consultation in regard to new diagnosis of right breast cancer.    Patient has a history of left breast carcinoma diagnosed in 2000.  Status post lumpectomy, adjuvant chemotherapy with AC, adjuvant radiation, adjuvant tamoxifen for 2 years then letrozole for 3 years.  Patient also reported history of precancerous lesion of the cervix.  No malignancy involving the ovaries.  No malignancy other than breast cancer.    Patient remained under surveillance, she underwent a routine screening mammogram which noted a 4 mm suspicious mass in the right breast.  Patient did not feel a lump.  Did not notice any nipple discharge.  No lumps in either axilla.  She underwent an ultrasound-guided needle biopsy from breast lesion which came back showing invasive lobular carcinoma.  Grade 1.  ER positive KS positive HER2/jesus negative.  With background of atypical ductal hyperplasia.  Lymph node biopsy from the right axilla negative for malignancy.    She underwent bilateral breast MRI with and without on January 16, 2025 which showed a biopsy site in the right breast.  No other lesions is noted.  There are an area of non-mass like enhancement within the central and outer central left breast.  Which looks nonspecific per imaging and could be rated to radiation changes.  Increased signal abnormality demonstrated within the left axilla.  This also can be related to radiation therapy.  Patient underwent a biopsy on January 22, 2025 from the suspicious

## 2025-01-24 ENCOUNTER — CLINICAL DOCUMENTATION (OUTPATIENT)
Dept: WOMENS IMAGING | Age: 75
End: 2025-01-24

## 2025-01-24 DIAGNOSIS — C50.411 MALIGNANT NEOPLASM OF UPPER-OUTER QUADRANT OF RIGHT BREAST IN FEMALE, ESTROGEN RECEPTOR POSITIVE (HCC): Primary | ICD-10-CM

## 2025-01-24 DIAGNOSIS — Z17.0 MALIGNANT NEOPLASM OF UPPER-OUTER QUADRANT OF RIGHT BREAST IN FEMALE, ESTROGEN RECEPTOR POSITIVE (HCC): Primary | ICD-10-CM

## 2025-01-24 NOTE — PROGRESS NOTES
Contact Type :    Telephone  Notes :  After consulting with Dr. Ely,  Trinidad was contacted by telephone.  She was informed of the negative biopsy results and the need to return for a 6 month follow up.  She voiced understanding.    Biopsy site: Fine     Results faxed to: Dr. Leopold and Dr. Jackson

## 2025-01-24 NOTE — PROGRESS NOTES
Name: Trinidad Smart  : 1950  MRN: [id number]    Oncology Navigation- Follow-up      Contact Type: Medical Oncology, pt seen in conjunction with Dr. Yeager    Diagnosis:  Right breast invasive lobular carcinoma, ER+ OH+ HER2- diagnosed 2024.  History of left breast cancer in     Oncology Plan of Care:    *Will send specimen for genetic testing.  *DEXA scan. Last scan 2022  *Appt with surgeon pending results of MRI breast biopsy completed 25     Education:  Discussed genetic testing process. Pt is interested in testing. Patient is aware of genetic session via phone. Individualized shared decision making utilized.     Referrals: Refer to [facility] genetic counselor     Assistance Needed: none at this time    Continuum of Care: Diagnosis/Active Treatment    Notes: Navigation program explained. Questions addressed. Contact information provided.     Obtained [facility] genetic blood sample from lab dept. Verified label with patient's information per protocol. Packaged for shipping with demographics/insurance, pathology, and pedigree. Scheduled [employer] pickup for 25. Tracking number scanned in media for reference. Referral faxed to [facility] Coordination Team. Lida Cadena [facility] will order test after pre-counseling completed.       Electronically signed by Akila Gonsalez RN on 2025 at 5:34 AM

## 2025-01-27 ENCOUNTER — PREP FOR PROCEDURE (OUTPATIENT)
Dept: SURGERY | Age: 75
End: 2025-01-27

## 2025-01-27 DIAGNOSIS — Z01.818 PRE-OP TESTING: ICD-10-CM

## 2025-01-27 DIAGNOSIS — C50.411 MALIGNANT NEOPLASM OF UPPER-OUTER QUADRANT OF RIGHT BREAST IN FEMALE, ESTROGEN RECEPTOR POSITIVE (HCC): Primary | ICD-10-CM

## 2025-01-27 DIAGNOSIS — I10 PRIMARY HYPERTENSION: ICD-10-CM

## 2025-01-27 DIAGNOSIS — Z17.0 MALIGNANT NEOPLASM OF UPPER-OUTER QUADRANT OF RIGHT BREAST IN FEMALE, ESTROGEN RECEPTOR POSITIVE (HCC): Primary | ICD-10-CM

## 2025-01-27 RX ORDER — SODIUM CHLORIDE 9 MG/ML
INJECTION, SOLUTION INTRAVENOUS CONTINUOUS
Status: CANCELLED | OUTPATIENT
Start: 2025-01-27

## 2025-01-27 NOTE — PROGRESS NOTES
[facility] [address] GENERAL SURGERY  General Surgery  Clinic  Note    Pt Name: Trinidad Smart  Medical Record Number: [id number]  Date of Birth 1950   Today's Date: 01/16/2025      ASSESSMENT/ PLAN:     Assessment & Plan    Lobular carcinoma of the right breast.  Abnormal MRI of the left breast with history of prior left breast lobular carcinoma status post lumpectomy followed by chemoradiation and hormonal therapy    Plan for MRI guided biopsy.  After that we will get patient scheduled for surgical intervention.       SUBJECTIVE     History of Present Illness    Since I saw patient last she is doing well, MRI did not show anything on the right side other than the known small malignancy no suspicious lymphadenopathy seen.  Patient without other complaints.  She is hopeful to be able to minimize surgical invasiveness of surgery when the time comes.  I will see her back after biopsy          Current Outpatient Medications on File Prior to Visit   Medication Sig Dispense Refill    penicillin v potassium (VEETID) 500 MG tablet TAKE 1 TABLET BY MOUTH 4 TIMES DAILY UNTIL GONE      amLODIPine (NORVASC) 10 MG tablet Take 1 tablet by mouth daily 90 tablet 3    losartan (COZAAR) 100 MG tablet Take 1 tablet by mouth daily 90 tablet 3    metoprolol succinate (TOPROL XL) 200 MG extended release tablet Take 1 tablet by mouth daily 90 tablet 3    simvastatin (ZOCOR) 20 MG tablet Take 1 tablet by mouth nightly 90 tablet 3    AZELASTINE HCL OP       Cholecalciferol (VITAMIN D3) 125 MCG (5000 UT) TABS Take by mouth daily      Melatonin 5 MG CAPS Take by mouth nightly as needed      fluticasone (FLONASE) 50 MCG/ACT nasal spray 2 sprays by Each Nostril route daily      guaiFENesin (MUCUS RELIEF ADULT PO) Take 1,200 mg by mouth in the morning and at bedtime       No current facility-administered medications on file prior to visit.       OBJECTIVE   CURRENT VITALS:  temporal temperature is 97.1 °F (36.2 °C). Her blood

## 2025-01-28 ENCOUNTER — LAB (OUTPATIENT)
Dept: LAB | Age: 75
End: 2025-01-28

## 2025-01-28 ENCOUNTER — HOSPITAL ENCOUNTER (OUTPATIENT)
Age: 75
Discharge: HOME | End: 2025-01-28
Payer: COMMERCIAL

## 2025-01-28 ENCOUNTER — TELEPHONE (OUTPATIENT)
Dept: SURGERY | Age: 75
End: 2025-01-28

## 2025-01-28 DIAGNOSIS — C50.411 MALIGNANT NEOPLASM OF UPPER-OUTER QUADRANT OF RIGHT BREAST IN FEMALE, ESTROGEN RECEPTOR POSITIVE (HCC): ICD-10-CM

## 2025-01-28 DIAGNOSIS — Z17.0 MALIGNANT NEOPLASM OF UPPER-OUTER QUADRANT OF RIGHT BREAST IN FEMALE, ESTROGEN RECEPTOR POSITIVE (HCC): ICD-10-CM

## 2025-01-28 DIAGNOSIS — I10 PRIMARY HYPERTENSION: ICD-10-CM

## 2025-01-28 DIAGNOSIS — Z01.818 PRE-OP TESTING: ICD-10-CM

## 2025-01-28 LAB
ANION GAP SERPL CALC-SCNC: 10 MEQ/L (ref 8–16)
BUN SERPL-MCNC: 12 MG/DL (ref 7–22)
CALCIUM SERPL-MCNC: 10.7 MG/DL (ref 8.5–10.5)
CHLORIDE SERPL-SCNC: 100 MEQ/L (ref 98–111)
CO2 SERPL-SCNC: 27 MEQ/L (ref 23–33)
CREAT SERPL-MCNC: 0.8 MG/DL (ref 0.4–1.2)
EKG ATRIAL RATE: 65 BPM
EKG P AXIS: 67 DEGREES
EKG P-R INTERVAL: 168 MS
EKG Q-T INTERVAL: 394 MS
EKG QRS DURATION: 74 MS
EKG QTC CALCULATION (BAZETT): 409 MS
EKG R AXIS: 36 DEGREES
EKG T AXIS: 69 DEGREES
EKG VENTRICULAR RATE: 65 BPM
GFR SERPL CREATININE-BSD FRML MDRD: 76 ML/MIN/1.73M2
GLUCOSE SERPL-MCNC: 116 MG/DL (ref 70–108)
HCT VFR BLD AUTO: 41.8 % (ref 37–47)
HGB BLD-MCNC: 13.7 GM/DL (ref 12–16)
POTASSIUM SERPL-SCNC: 4 MEQ/L (ref 3.5–5.2)
SODIUM SERPL-SCNC: 137 MEQ/L (ref 135–145)

## 2025-01-28 PROCEDURE — 93005 ELECTROCARDIOGRAM TRACING: CPT | Performed by: SURGERY

## 2025-01-28 NOTE — TELEPHONE ENCOUNTER
Per Yancy at [facility] CPT 00258 does not require prior authorization.  Call Ref# Yancy 01/28/2025

## 2025-01-29 ENCOUNTER — TELEPHONE (OUTPATIENT)
Dept: SURGERY | Age: 75
End: 2025-01-29

## 2025-01-29 NOTE — TELEPHONE ENCOUNTER
Patient scheduled for lumpectomy with preop needle localization with Dr. Jackson on 01-.  She called to advise she has a bad tooth and was suppose to have a root canal done.  The tooth is so bad they could not do the root canal; the tooth needs pulled.  When asked if it was infected she stated \"well it hurts, so I think so.\"  Patient currently not on any antibiotic.    Per Dr. Jackson start Augmentin 875mg BID to be sent to [facility] in [place]

## 2025-01-30 ENCOUNTER — TELEPHONE (OUTPATIENT)
Dept: SURGERY | Age: 75
End: 2025-01-30

## 2025-01-30 NOTE — TELEPHONE ENCOUNTER
Patient notified her surgery moved from [facility]'s same day surgery to [facility] at [address], [address].  Arrival time is the same at [facility].  Kendra notified at [facility]'s [facility].

## 2025-01-31 ENCOUNTER — HOSPITAL ENCOUNTER (OUTPATIENT)
Dept: WOMENS IMAGING | Age: 75
Discharge: HOME | End: 2025-01-31
Attending: SURGERY
Payer: COMMERCIAL

## 2025-01-31 ENCOUNTER — HOSPITAL ENCOUNTER (OUTPATIENT)
Dept: WOMENS IMAGING | Age: 75
Discharge: HOME | End: 2025-01-31
Payer: COMMERCIAL

## 2025-01-31 ENCOUNTER — ANESTHESIA EVENT (OUTPATIENT)
Dept: OPERATING ROOM | Age: 75
End: 2025-01-31
Payer: COMMERCIAL

## 2025-01-31 ENCOUNTER — ANESTHESIA (OUTPATIENT)
Dept: OPERATING ROOM | Age: 75
End: 2025-01-31
Payer: COMMERCIAL

## 2025-01-31 ENCOUNTER — HOSPITAL ENCOUNTER (OUTPATIENT)
Age: 75
Setting detail: OUTPATIENT SURGERY
Discharge: HOME | End: 2025-01-31
Attending: SURGERY | Admitting: SURGERY
Payer: COMMERCIAL

## 2025-01-31 VITALS
OXYGEN SATURATION: 95 % | HEART RATE: 72 BPM | BODY MASS INDEX: 29.16 KG/M2 | RESPIRATION RATE: 13 BRPM | DIASTOLIC BLOOD PRESSURE: 66 MMHG | WEIGHT: 175 LBS | HEIGHT: 65 IN | TEMPERATURE: 97.1 F | SYSTOLIC BLOOD PRESSURE: 144 MMHG

## 2025-01-31 DIAGNOSIS — N63.11 MASS OF UPPER OUTER QUADRANT OF RIGHT BREAST: ICD-10-CM

## 2025-01-31 DIAGNOSIS — Z01.818 PRE-OP TESTING: ICD-10-CM

## 2025-01-31 DIAGNOSIS — I10 PRIMARY HYPERTENSION: ICD-10-CM

## 2025-01-31 DIAGNOSIS — C50.411 MALIGNANT NEOPLASM OF UPPER-OUTER QUADRANT OF RIGHT FEMALE BREAST, UNSPECIFIED ESTROGEN RECEPTOR STATUS (HCC): Primary | ICD-10-CM

## 2025-01-31 DIAGNOSIS — C50.411 MALIGNANT NEOPLASM OF UPPER-OUTER QUADRANT OF RIGHT FEMALE BREAST (HCC): ICD-10-CM

## 2025-01-31 DIAGNOSIS — Z17.0 MALIGNANT NEOPLASM OF UPPER-OUTER QUADRANT OF RIGHT BREAST IN FEMALE, ESTROGEN RECEPTOR POSITIVE (HCC): ICD-10-CM

## 2025-01-31 DIAGNOSIS — C50.411 MALIGNANT NEOPLASM OF UPPER-OUTER QUADRANT OF RIGHT BREAST IN FEMALE, ESTROGEN RECEPTOR POSITIVE (HCC): ICD-10-CM

## 2025-01-31 PROCEDURE — 6360000002 HC RX W HCPCS: Performed by: NURSE ANESTHETIST, CERTIFIED REGISTERED

## 2025-01-31 PROCEDURE — 19301 PARTIAL MASTECTOMY: CPT | Performed by: SURGERY

## 2025-01-31 PROCEDURE — 3600000002 HC SURGERY LEVEL 2 BASE: Performed by: SURGERY

## 2025-01-31 PROCEDURE — 3700000001 HC ADD 15 MINUTES (ANESTHESIA): Performed by: SURGERY

## 2025-01-31 PROCEDURE — 7100000010 HC PHASE II RECOVERY - FIRST 15 MIN: Performed by: SURGERY

## 2025-01-31 PROCEDURE — 88342 IMHCHEM/IMCYTCHM 1ST ANTB: CPT

## 2025-01-31 PROCEDURE — 7100000001 HC PACU RECOVERY - ADDTL 15 MIN: Performed by: SURGERY

## 2025-01-31 PROCEDURE — 88307 TISSUE EXAM BY PATHOLOGIST: CPT

## 2025-01-31 PROCEDURE — 2709999900 HC NON-CHARGEABLE SUPPLY: Performed by: SURGERY

## 2025-01-31 PROCEDURE — 2500000003 HC RX 250 WO HCPCS: Performed by: NURSE ANESTHETIST, CERTIFIED REGISTERED

## 2025-01-31 PROCEDURE — G0279 TOMOSYNTHESIS, MAMMO: HCPCS

## 2025-01-31 PROCEDURE — 6360000002 HC RX W HCPCS: Performed by: SURGERY

## 2025-01-31 PROCEDURE — 7100000011 HC PHASE II RECOVERY - ADDTL 15 MIN: Performed by: SURGERY

## 2025-01-31 PROCEDURE — 3600000012 HC SURGERY LEVEL 2 ADDTL 15MIN: Performed by: SURGERY

## 2025-01-31 PROCEDURE — 3700000000 HC ANESTHESIA ATTENDED CARE: Performed by: SURGERY

## 2025-01-31 PROCEDURE — 6360000002 HC RX W HCPCS: Performed by: NURSE PRACTITIONER

## 2025-01-31 PROCEDURE — 2709999900 US PLACE BREAST LOC DEVICE 1ST LESION RIGHT

## 2025-01-31 PROCEDURE — 2580000003 HC RX 258: Performed by: NURSE PRACTITIONER

## 2025-01-31 PROCEDURE — 7100000000 HC PACU RECOVERY - FIRST 15 MIN: Performed by: SURGERY

## 2025-01-31 PROCEDURE — 2500000003 HC RX 250 WO HCPCS: Performed by: NURSE PRACTITIONER

## 2025-01-31 PROCEDURE — 76098 X-RAY EXAM SURGICAL SPECIMEN: CPT

## 2025-01-31 PROCEDURE — 88341 IMHCHEM/IMCYTCHM EA ADD ANTB: CPT

## 2025-01-31 RX ORDER — EPHEDRINE SULFATE/0.9% NACL/PF 25 MG/5 ML
SYRINGE (ML) INTRAVENOUS
Status: DISCONTINUED | OUTPATIENT
Start: 2025-01-31 | End: 2025-01-31 | Stop reason: SDUPTHER

## 2025-01-31 RX ORDER — PROPOFOL 10 MG/ML
INJECTION, EMULSION INTRAVENOUS
Status: DISCONTINUED | OUTPATIENT
Start: 2025-01-31 | End: 2025-01-31 | Stop reason: SDUPTHER

## 2025-01-31 RX ORDER — BUPIVACAINE HYDROCHLORIDE 5 MG/ML
INJECTION, SOLUTION PERINEURAL PRN
Status: DISCONTINUED | OUTPATIENT
Start: 2025-01-31 | End: 2025-01-31 | Stop reason: ALTCHOICE

## 2025-01-31 RX ORDER — ONDANSETRON 2 MG/ML
INJECTION INTRAMUSCULAR; INTRAVENOUS
Status: DISCONTINUED | OUTPATIENT
Start: 2025-01-31 | End: 2025-01-31 | Stop reason: SDUPTHER

## 2025-01-31 RX ORDER — LIDOCAINE HYDROCHLORIDE 20 MG/ML
INJECTION, SOLUTION INTRAVENOUS
Status: DISCONTINUED | OUTPATIENT
Start: 2025-01-31 | End: 2025-01-31 | Stop reason: SDUPTHER

## 2025-01-31 RX ORDER — HYDROCODONE BITARTRATE AND ACETAMINOPHEN 5; 325 MG/1; MG/1
1 TABLET ORAL EVERY 6 HOURS PRN
Qty: 12 TABLET | Refills: 0 | Status: SHIPPED | OUTPATIENT
Start: 2025-01-31 | End: 2025-02-03

## 2025-01-31 RX ORDER — DEXAMETHASONE SODIUM PHOSPHATE 10 MG/ML
INJECTION, EMULSION INTRAMUSCULAR; INTRAVENOUS
Status: DISCONTINUED | OUTPATIENT
Start: 2025-01-31 | End: 2025-01-31 | Stop reason: SDUPTHER

## 2025-01-31 RX ORDER — SODIUM CHLORIDE 9 MG/ML
INJECTION, SOLUTION INTRAVENOUS CONTINUOUS
Status: DISCONTINUED | OUTPATIENT
Start: 2025-01-31 | End: 2025-01-31 | Stop reason: HOSPADM

## 2025-01-31 RX ORDER — FENTANYL CITRATE 50 UG/ML
INJECTION, SOLUTION INTRAMUSCULAR; INTRAVENOUS
Status: DISCONTINUED | OUTPATIENT
Start: 2025-01-31 | End: 2025-01-31 | Stop reason: SDUPTHER

## 2025-01-31 RX ADMIN — PROPOFOL 50 MG: 10 INJECTION, EMULSION INTRAVENOUS at 10:21

## 2025-01-31 RX ADMIN — FENTANYL CITRATE 50 MCG: 50 INJECTION, SOLUTION INTRAMUSCULAR; INTRAVENOUS at 10:15

## 2025-01-31 RX ADMIN — EPHEDRINE SULFATE 10 MG: 5 INJECTION INTRAVENOUS at 10:39

## 2025-01-31 RX ADMIN — FENTANYL CITRATE 50 MCG: 50 INJECTION, SOLUTION INTRAMUSCULAR; INTRAVENOUS at 10:26

## 2025-01-31 RX ADMIN — EPHEDRINE SULFATE 10 MG: 5 INJECTION INTRAVENOUS at 10:32

## 2025-01-31 RX ADMIN — WATER 2000 MG: 1 INJECTION INTRAMUSCULAR; INTRAVENOUS; SUBCUTANEOUS at 10:22

## 2025-01-31 RX ADMIN — ONDANSETRON 4 MG: 2 INJECTION INTRAMUSCULAR; INTRAVENOUS at 10:41

## 2025-01-31 RX ADMIN — EPHEDRINE SULFATE 10 MG: 5 INJECTION INTRAVENOUS at 10:30

## 2025-01-31 RX ADMIN — DEXAMETHASONE SODIUM PHOSPHATE 10 MG: 10 INJECTION, EMULSION INTRAMUSCULAR; INTRAVENOUS at 10:27

## 2025-01-31 RX ADMIN — LIDOCAINE HYDROCHLORIDE 100 MG: 20 INJECTION, SOLUTION INTRAVENOUS at 10:15

## 2025-01-31 RX ADMIN — SODIUM CHLORIDE: 9 INJECTION, SOLUTION INTRAVENOUS at 10:12

## 2025-01-31 RX ADMIN — EPHEDRINE SULFATE 10 MG: 5 INJECTION INTRAVENOUS at 10:37

## 2025-01-31 RX ADMIN — EPHEDRINE SULFATE 5 MG: 5 INJECTION INTRAVENOUS at 10:34

## 2025-01-31 RX ADMIN — EPHEDRINE SULFATE 5 MG: 5 INJECTION INTRAVENOUS at 10:40

## 2025-01-31 RX ADMIN — PROPOFOL 150 MG: 10 INJECTION, EMULSION INTRAVENOUS at 10:15

## 2025-01-31 ASSESSMENT — PAIN - FUNCTIONAL ASSESSMENT
PAIN_FUNCTIONAL_ASSESSMENT: 0-10
PAIN_FUNCTIONAL_ASSESSMENT: NONE - DENIES PAIN

## 2025-01-31 ASSESSMENT — ENCOUNTER SYMPTOMS: GASTROINTESTINAL NEGATIVE: 1

## 2025-01-31 NOTE — PROGRESS NOTES
1125-Received report from Eve MCKOY. Patient resting with eyes closed and denies needs.     1130-pt taking in ice chips, tolerating well.     1135-Pt resting with eyes closed, denies needs. VSS.    1136-criteria met for Phase II. Transferred to room 16. Call light in hand and instructed on use. Family at bedside. Snack and drink provided.     1150-Pt resting, eating snack and drink and denies needs.     1200-Pt sitting on the side of the bed. IV removed, sister helping the patient get dressed.     1210-discharge instructions provided to patient and her sister, both voiced understanding.     1225-pt discharged home in stable condition. Walked to car with staff. All belongings sent. Home with family.

## 2025-01-31 NOTE — ANESTHESIA POSTPROCEDURE EVALUATION
Department of Anesthesiology  Postprocedure Note    Patient: Trinidad Smart  MRN: [id number]  YOB: 1950  Date of evaluation: 1/31/2025    Procedure Summary       Date: 01/31/25 Room / Location: [address]    Anesthesia Start: 1010 Anesthesia Stop: 1108    Procedure: Right breast lumpectomy with preop needle localization (Right: Breast) Diagnosis:       Malignant neoplasm of upper-outer quadrant of right female breast (HCC)      (Malignant neoplasm of upper-outer quadrant of right female breast (HCC) [C50.411])    Surgeons: Latricia Jackson MD Responsible Provider: Trever Crain MD    Anesthesia Type: general ASA Status: 3            Anesthesia Type: No value filed.    Jeyson Phase I: Jeyson Score: 10    Jeyson Phase II: Jeyson Score: 9    Anesthesia Post Evaluation    Patient location during evaluation: PACU  Patient participation: complete - patient participated  Level of consciousness: awake  Airway patency: patent  Nausea & Vomiting: no vomiting and no nausea  Cardiovascular status: hemodynamically stable  Respiratory status: acceptable  Hydration status: stable  Pain management: adequate    No notable events documented.

## 2025-01-31 NOTE — DISCHARGE INSTRUCTIONS
DR. ROA'S DISCHARGE INSTRUCTIONS    Pt Name: Trinidad Smart  Medical Record Number: [id number]  Today's Date: 1/31/2025  GENERAL ANESTHESIA OR SEDATION   1. Do not drive or operate hazardous machinery for 24 hours.  2. Do not make important business or personal decisions for 24 hours.  3. Do not drink alcoholic beverages or use tobacco for 24 hours.  ACTIVITY INSTRUCTIONS   Rest today. Resume light to normal activity tomorrow.  You may resume normal activity tomorrow. Do not engage in strenuous activity that may place stress on your incision.  Do not drive for 3-5 days and avoid heavy lifting, tugging, pullings greater than 10-20 lbs until seen in the office.    DIET INSTRUCTIONS   Begin with clear liquids. If not nauseated, may increase to a low-fat diet when you desire. Greasy and spicy foods are not advised.  Regular diet as tolerated.  MEDICATIONS   Prescription written for Norco. Take as directed.  Do not drink alcohol or drive while taking pain medications. You may experience dizziness or drowsiness with these medications. You may also experience constipation which can be relieved with stool softners or laxatives.  You may resume your daily prescription medication schedule unless otherwise specified.  Do not take 325mg Aspirin or other blood thinners such as Coumadin or Plavix for 5 days.  WOUND & DRESSING INSTRUCTIONS   Always ensure you and your care giver clean hands before and after caring for the wound.  Keep dressing clean and dry for 48 hours. Change when soiled or wet.      May wash over incision in shower in 48 hours, but do not soak in a bath.  3. Following breast procedures it is important to use supportive garments      FOLLOW UP CARE, SPECIFICALLY WATCH FOR:    Fever over 101 degrees by mouth   Increased redness, warmth, hardness at operative site.   Blood soaked dressing (small amounts of oozing may be normal.)   Increased or progressive drainage from the surgical area   Inability to

## 2025-01-31 NOTE — PROGRESS NOTES
Contact Type:  [facility]    Contact Information: Arrived with sister for needle localization. Having breast surgery today with Dr. Jackson.    Diagnosis: Invasive lobular carcinoma    Patient Expresses Need(s) For: n/a     Teaching Sheets/Booklets Provided: n/a    Notes: Procedure explained and questions addressed as needed.  Dr. Ely placed wire. Secured with gauze and tape per protocol. Gift given. Transported patient with belongings to [facility] via wheelchair at 0835.

## 2025-01-31 NOTE — PROGRESS NOTES
1106: patient to pacu via bed, report recieved, attached to monitor, vital signs stable, iv infusing via gravity, incision remains clean and intact. No bleeding noted. Spontaneous respirations. Patient awake and talking, denies pain.  SCD's in place. Patient states she is comfortable. Resting in bed with eyes closed.  RN remains at bedside.     1115: patient denies needs.  Remains in bed with eyes closed.  Vital sign stable.      1120: report given to LEELEE Orellana

## 2025-01-31 NOTE — H&P
Latricia Jackson MD MD    General Surgery  New Patient Evaluation in Office  Pt Name: Trinidad Smart  Date of Birth 1950   Today's Date: 1/31/2025  Medical Record Number: [id number]  Referring Provider: No ref. provider found  Primary Care Provider: Leopold, Katelyn Ann, MD  No chief complaint on file.    ASSESSMENT      Problem List Items Addressed This Visit    None      Past Medical History:   Diagnosis Date    Allergic rhinitis 04/30/1982    Allergies     Aortic stenosis     Breast cancer (HCC) 2000    left breast    Breast cancer (HCC) 2025    right    Cancer (HCC)     breast stage 1; status post lumpectomy, chemo, and radiation in 2000    History of therapeutic radiation     Hx antineoplastic chemo     Hyperlipidemia     Hyperparathyroidism (HCC)     Hypertension     Osteopenia of multiple sites     Skin cancer, basal cell            Cancer Staging   Malignant neoplasm of upper-outer quadrant of right breast in female, estrogen receptor positive (HCC)  Staging form: Breast, AJCC 8th Edition  - Clinical stage from 1/7/2025: Stage IA (cT1, cN0, cM0, G1, ER+, ND+, HER2-) - Signed by Latricia Jackson MD on 1/17/2025      PLANS   Assessment & Plan   Schedule Trinidad for   Wirelocalized lumpectomy after MARK completed that required biopsy that were non malignant   Anticipate Lumpectomy with SLN omission  Referral to oncology  Referral to rad onc   In the office, I had a discussion with the patient regarding the treatment options for invasive breast cancer. We discussed lumpectomy and radiation versus mastectomy. We discussed the fact that there is no statistical difference in long term survival or recurrence between the two options.  Candidate for Lumpectomy YES/NO: Yes  Candidate for omission of sln bx YES/NO: Yes  Candidate for potential omission of radiation YES/NO: Yes  We had a long discussion in the office regarding treatment options for breast cancer. We discussed lumpectomy and radiation versus simple

## 2025-02-03 ENCOUNTER — TELEPHONE (OUTPATIENT)
Dept: SURGERY | Age: 75
End: 2025-02-03

## 2025-02-03 NOTE — TELEPHONE ENCOUNTER
Post procedural phone call placed to patient. Patient utilizing prescribed medication with adequate control of pain, now using OTC. Patients follow up appointment verified and confirmed in chart. Time spent for patient questions. Patient to follow up with office as needed.

## 2025-02-05 NOTE — OP NOTE
Operative Note      Patient: Trinidad Smart  YOB: 1950  MRN: [id number]    Date of Procedure: 1/31/2025    Pre-Op Diagnosis Codes:      * Malignant neoplasm of upper-outer quadrant of right female breast (HCC) [C50.411]   Cancer Staging   Malignant neoplasm of upper-outer quadrant of right breast in female, estrogen receptor positive (HCC)  Staging form: Breast, AJCC 8th Edition  - Clinical stage from 1/7/2025: Stage IA (cT1, cN0, cM0, G1, ER+, SD+, HER2-) - Signed by Latricia Jackson MD on 1/17/2025    Post-Op Diagnosis: Same       Procedure(s):  Right breast lumpectomy with preop needle localization    Surgeon(s):  Latricia Jackson MD    Assistant:   First Assistant: David Larson RN    Anesthesia: General    Estimated Blood Loss (mL): 10 ml       Complications: None    Specimens:   ID Type Source Tests Collected by Time Destination   A : Right breast biopsy needle localization Tissue Breast SURGICAL PATHOLOGY Latricia Jackson MD 1/31/2025 1026        Implants:  * No implants in log *      Drains: * No LDAs found *    Findings:  Infection Present At Time Of Surgery (PATOS) (choose all levels that have infection present):  No infection present  Other Findings: wire localization with wire, clip and lesion in specimen   This procedure was not performed to treat breast cancer through sentinel node biopsy       Detailed Description of Procedure:   She was me in the preoperative holding room where informed consent was reviewed she had [facility] and wire localization was placed images were reviewed.  She is taken the operating placed on the table after adequate anesthesia formed mass performed is prepped and draped normal sterile fashion.  A curvilinear incision was made in her breast was dissected down through the fatty tissue.  The planes of the wire were dissected down to and then the lesion was circumferentially dissected out.  It was removed from surrounding tissue, it was

## 2025-02-06 ENCOUNTER — OFFICE VISIT (OUTPATIENT)
Dept: SURGERY | Age: 75
End: 2025-02-06

## 2025-02-06 VITALS
BODY MASS INDEX: 29.16 KG/M2 | DIASTOLIC BLOOD PRESSURE: 72 MMHG | OXYGEN SATURATION: 97 % | HEIGHT: 65 IN | SYSTOLIC BLOOD PRESSURE: 130 MMHG | TEMPERATURE: 97.5 F | HEART RATE: 76 BPM | WEIGHT: 175 LBS

## 2025-02-06 DIAGNOSIS — C50.411 MALIGNANT NEOPLASM OF UPPER-OUTER QUADRANT OF RIGHT FEMALE BREAST, UNSPECIFIED ESTROGEN RECEPTOR STATUS (HCC): Primary | ICD-10-CM

## 2025-02-06 PROCEDURE — 99024 POSTOP FOLLOW-UP VISIT: CPT | Performed by: SURGERY

## 2025-02-06 NOTE — PROGRESS NOTES
[facility]  General Surgery  Clinic  Note    Pt Name: Trinidad Smart  Medical Record Number: [id number]  Date of Birth 1950   Today's Date: 02/06/2025      ASSESSMENT/ PLAN:     Assessment & Plan  1. Invasive lobular carcinoma.  The invasive lobular carcinoma has been successfully excised with c1.4 mm margins. 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 Radiation therapy might be recommended, and antihormone medication will be restarted. A referral to radiation oncology and medical oncology will be initiated for further discussion regarding potential radiation therapy and hormone therapy. She is advised to monitor the surgical site for any signs of redness. If such symptoms arise, antibiotic treatment will be considered to rule out a seroma. She is also instructed to maintain normal hygiene practices, including washing the area, and to allow the surgical glue to naturally peel off.         SUBJECTIVE     History of Present Illness  The patient presents for evaluation of invasive lobular cancer.    She reports a satisfactory healing process with no complications. She notes a slight warmth in the affected area compared to the contralateral side. She expresses a preference to avoid radiation therapy due to the inconvenience of travel, as she resides in [place], approximately a 35 to 40-minute drive from the treatment facility. She has an upcoming appointment scheduled with Dr. Ryan.        CURRENT MEDICATIONS     Current Outpatient Medications on File Prior to Visit   Medication Sig Dispense Refill    amoxicillin-clavulanate (AUGMENTIN) 875-125 MG per tablet Take 1 tablet by mouth 2 times daily for 10 days 20 tablet 0    Calcium Citrate-Vitamin D (CALCIUM + VIT D, BARIATRIC ADVANTAGE, CHEWABLE TABLET) Take 1 tablet by mouth 2 times daily 60 tablet 0

## 2025-02-12 ENCOUNTER — TELEPHONE (OUTPATIENT)
Dept: ONCOLOGY | Age: 75
End: 2025-02-12

## 2025-02-12 ENCOUNTER — HOSPITAL ENCOUNTER (OUTPATIENT)
Dept: WOMENS IMAGING | Age: 75
Discharge: HOME | End: 2025-02-12
Attending: INTERNAL MEDICINE
Payer: COMMERCIAL

## 2025-02-12 DIAGNOSIS — Z17.0 MALIGNANT NEOPLASM OF UPPER-OUTER QUADRANT OF RIGHT BREAST IN FEMALE, ESTROGEN RECEPTOR POSITIVE (HCC): ICD-10-CM

## 2025-02-12 DIAGNOSIS — C50.411 MALIGNANT NEOPLASM OF UPPER-OUTER QUADRANT OF RIGHT BREAST IN FEMALE, ESTROGEN RECEPTOR POSITIVE (HCC): ICD-10-CM

## 2025-02-12 PROCEDURE — 77080 DXA BONE DENSITY AXIAL: CPT

## 2025-02-12 RX ORDER — ALENDRONATE SODIUM 70 MG/1
70 TABLET ORAL
Qty: 13 TABLET | Refills: 0 | Status: SHIPPED | OUTPATIENT
Start: 2025-02-12

## 2025-02-12 NOTE — TELEPHONE ENCOUNTER
Dexa scan is showing sever Osteopenia.   T -2.4.    For anticipation use of Hormonal therapy.     Discussed use of Fosamax. Izaiah effects discussed.

## 2025-02-20 ENCOUNTER — HOSPITAL ENCOUNTER (OUTPATIENT)
Dept: INFUSION THERAPY | Age: 75
Discharge: HOME | End: 2025-02-20
Payer: COMMERCIAL

## 2025-02-20 ENCOUNTER — OFFICE VISIT (OUTPATIENT)
Dept: ONCOLOGY | Age: 75
End: 2025-02-20
Payer: COMMERCIAL

## 2025-02-20 VITALS
HEIGHT: 65 IN | DIASTOLIC BLOOD PRESSURE: 66 MMHG | SYSTOLIC BLOOD PRESSURE: 143 MMHG | RESPIRATION RATE: 18 BRPM | HEART RATE: 74 BPM | OXYGEN SATURATION: 94 % | WEIGHT: 180.4 LBS | TEMPERATURE: 97.6 F | BODY MASS INDEX: 30.06 KG/M2

## 2025-02-20 VITALS
HEART RATE: 74 BPM | RESPIRATION RATE: 18 BRPM | BODY MASS INDEX: 30.06 KG/M2 | OXYGEN SATURATION: 94 % | SYSTOLIC BLOOD PRESSURE: 143 MMHG | HEIGHT: 65 IN | TEMPERATURE: 97.6 F | DIASTOLIC BLOOD PRESSURE: 66 MMHG | WEIGHT: 180.4 LBS

## 2025-02-20 DIAGNOSIS — C50.411 MALIGNANT NEOPLASM OF UPPER-OUTER QUADRANT OF RIGHT BREAST IN FEMALE, ESTROGEN RECEPTOR POSITIVE (HCC): Primary | ICD-10-CM

## 2025-02-20 DIAGNOSIS — Z17.0 MALIGNANT NEOPLASM OF UPPER-OUTER QUADRANT OF RIGHT BREAST IN FEMALE, ESTROGEN RECEPTOR POSITIVE (HCC): Primary | ICD-10-CM

## 2025-02-20 DIAGNOSIS — C50.411 MALIGNANT NEOPLASM OF UPPER-OUTER QUADRANT OF RIGHT BREAST IN FEMALE, ESTROGEN RECEPTOR POSITIVE (HCC): ICD-10-CM

## 2025-02-20 DIAGNOSIS — Z17.0 MALIGNANT NEOPLASM OF UPPER-OUTER QUADRANT OF RIGHT BREAST IN FEMALE, ESTROGEN RECEPTOR POSITIVE (HCC): ICD-10-CM

## 2025-02-20 DIAGNOSIS — C50.919 LOBULAR CARCINOMA OF BREAST, UNSPECIFIED LATERALITY (HCC): ICD-10-CM

## 2025-02-20 LAB
25(OH)D3 SERPL-MCNC: 62 NG/ML (ref 30–100)
ALBUMIN SERPL BCG-MCNC: 4.5 G/DL (ref 3.5–5.1)
ALP SERPL-CCNC: 169 U/L (ref 38–126)
ALT SERPL W/O P-5'-P-CCNC: 15 U/L (ref 11–66)
AST SERPL-CCNC: 19 U/L (ref 5–40)
BASOPHILS ABSOLUTE: 0 THOU/MM3 (ref 0–0.1)
BASOPHILS NFR BLD AUTO: 0 % (ref 0–3)
BILIRUB CONJ SERPL-MCNC: < 0.1 MG/DL (ref 0–0.2)
BILIRUB SERPL-MCNC: 0.5 MG/DL (ref 0.3–1.2)
BUN BLDP-MCNC: 14 MG/DL (ref 8–26)
CHLORIDE BLD-SCNC: 101 MEQ/L (ref 98–109)
CREAT BLD-MCNC: 0.6 MG/DL (ref 0.5–1.2)
EOSINOPHIL NFR BLD AUTO: 2 % (ref 0–4)
EOSINOPHILS ABSOLUTE: 0.2 THOU/MM3 (ref 0–0.4)
ERYTHROCYTE [DISTWIDTH] IN BLOOD BY AUTOMATED COUNT: 11.8 % (ref 11.5–14.5)
GFR SERPL CREATININE-BSD FRML MDRD: > 90 ML/MIN/1.73M2
GLUCOSE BLD-MCNC: 110 MG/DL (ref 70–108)
HCT VFR BLD AUTO: 43.1 % (ref 37–47)
HGB BLD-MCNC: 13.8 GM/DL (ref 12–16)
IMMATURE GRANULOCYTES %: 0 %
IMMATURE GRANULOCYTES ABSOLUTE: 0.01 THOU/MM3 (ref 0–0.07)
IONIZED CALCIUM, WHOLE BLOOD: 1.48 MMOL/L (ref 1.12–1.32)
LYMPHOCYTES ABSOLUTE: 1.5 THOU/MM3 (ref 1–4.8)
LYMPHOCYTES NFR BLD AUTO: 19 % (ref 15–47)
MCH RBC QN AUTO: 30.9 PG (ref 26–33)
MCHC RBC AUTO-ENTMCNC: 32 GM/DL (ref 32.2–35.5)
MCV RBC AUTO: 96 FL (ref 81–99)
MONOCYTES ABSOLUTE: 0.6 THOU/MM3 (ref 0.4–1.3)
MONOCYTES NFR BLD AUTO: 8 % (ref 0–12)
NEUTROPHILS ABSOLUTE: 5.5 THOU/MM3 (ref 1.8–7.7)
NEUTROPHILS NFR BLD AUTO: 70 % (ref 43–75)
PLATELET # BLD AUTO: 302 THOU/MM3 (ref 130–400)
PMV BLD AUTO: 8.9 FL (ref 9.4–12.4)
POTASSIUM BLD-SCNC: 4.9 MEQ/L (ref 3.5–4.9)
PROT SERPL-MCNC: 7.3 G/DL (ref 6.1–8)
RBC # BLD AUTO: 4.47 MILL/MM3 (ref 4.2–5.4)
SODIUM BLD-SCNC: 139 MEQ/L (ref 138–146)
TOTAL CO2, WHOLE BLOOD: 32 MEQ/L (ref 23–33)
WBC # BLD AUTO: 7.8 THOU/MM3 (ref 4.8–10.8)

## 2025-02-20 PROCEDURE — 1123F ACP DISCUSS/DSCN MKR DOCD: CPT | Performed by: INTERNAL MEDICINE

## 2025-02-20 PROCEDURE — 85025 COMPLETE CBC W/AUTO DIFF WBC: CPT

## 2025-02-20 PROCEDURE — 3078F DIAST BP <80 MM HG: CPT | Performed by: INTERNAL MEDICINE

## 2025-02-20 PROCEDURE — 80047 BASIC METABLC PNL IONIZED CA: CPT

## 2025-02-20 PROCEDURE — 99211 OFF/OP EST MAY X REQ PHY/QHP: CPT

## 2025-02-20 PROCEDURE — 99215 OFFICE O/P EST HI 40 MIN: CPT | Performed by: INTERNAL MEDICINE

## 2025-02-20 PROCEDURE — 80076 HEPATIC FUNCTION PANEL: CPT

## 2025-02-20 PROCEDURE — 82306 VITAMIN D 25 HYDROXY: CPT

## 2025-02-20 PROCEDURE — 3077F SYST BP >= 140 MM HG: CPT | Performed by: INTERNAL MEDICINE

## 2025-02-20 PROCEDURE — 36415 COLL VENOUS BLD VENIPUNCTURE: CPT

## 2025-02-20 ASSESSMENT — ENCOUNTER SYMPTOMS
ABDOMINAL DISTENTION: 0
SORE THROAT: 0
SHORTNESS OF BREATH: 0
CONSTIPATION: 0
BLOOD IN STOOL: 0
ABDOMINAL PAIN: 0
CHEST TIGHTNESS: 0
NAUSEA: 0
TROUBLE SWALLOWING: 0
WHEEZING: 0
VOICE CHANGE: 0
DIARRHEA: 0
VOMITING: 0
COUGH: 0

## 2025-02-20 NOTE — PROGRESS NOTES
[facility] [place] [facility]  [facility]  [address]  [address]  [place] [id number]  Dept: [phone]  Loc: [phone]   Hematology/Oncology Consult (Clinic)        Trinidad ZAMUDIO Bing   1950     No ref. provider found   Leopold, Katelyn Ann, MD     Reason:   Chief Complaint   Patient presents with    Follow-up     Malignant neoplasm of upper-outer quadrant of right breast in female, estrogen receptor positive      HPI:     74-year-old female patient w/new diagnosis of right breast cancer.    Patient has a history of left breast carcinoma diagnosed in 2000.  Status post lumpectomy, adjuvant chemotherapy with AC, adjuvant radiation, adjuvant tamoxifen for 2 years then letrozole for 3 years.  Patient also reported history of precancerous lesion of the cervix.  No malignancy involving the ovaries.  No malignancy other than breast cancer.    Patient remained under surveillance, she underwent a routine screening mammogram which noted a 4 mm suspicious mass in the right breast.  Patient did not feel a lump.  Did not notice any nipple discharge.  No lumps in either axilla.  She underwent an ultrasound-guided needle biopsy from breast lesion which came back showing invasive lobular carcinoma.  Grade 1.  ER positive [place] positive HER2/jesus negative.  With background of atypical ductal hyperplasia.  Lymph node biopsy from the right axilla negative for malignancy.    She underwent bilateral breast MRI with and without on January 16, 2025 which showed a biopsy site in the right breast.  No other lesions is noted.  There are an area of non-mass like enhancement within the central and outer central left breast.  Which looks nonspecific per imaging and could be rated to radiation changes.  Increased signal abnormality demonstrated within the left axilla.  This also can be related to radiation therapy.  Patient underwent a biopsy on January 22, 2025 from the suspicious lesion in the

## 2025-02-20 NOTE — PATIENT INSTRUCTIONS
Return in about 4 weeks (around 3/20/2025).   Data Unavailable   No orders of the defined types were placed in this encounter.     Fosamax that Dr Linares prescribed is once a week

## 2025-02-22 ENCOUNTER — PATIENT MESSAGE (OUTPATIENT)
Dept: FAMILY MEDICINE CLINIC | Age: 75
End: 2025-02-22

## 2025-02-22 DIAGNOSIS — E21.3 HYPERPARATHYROIDISM: Primary | ICD-10-CM

## 2025-02-24 ENCOUNTER — HOSPITAL ENCOUNTER (OUTPATIENT)
Dept: RADIATION ONCOLOGY | Age: 75
Discharge: HOME | End: 2025-02-24
Payer: COMMERCIAL

## 2025-02-24 VITALS
DIASTOLIC BLOOD PRESSURE: 65 MMHG | HEIGHT: 65 IN | RESPIRATION RATE: 16 BRPM | SYSTOLIC BLOOD PRESSURE: 144 MMHG | TEMPERATURE: 98.4 F | WEIGHT: 181.8 LBS | OXYGEN SATURATION: 95 % | HEART RATE: 73 BPM | BODY MASS INDEX: 30.29 KG/M2

## 2025-02-24 PROCEDURE — 99204 OFFICE O/P NEW MOD 45 MIN: CPT

## 2025-02-24 PROCEDURE — 99202 OFFICE O/P NEW SF 15 MIN: CPT | Performed by: RADIOLOGY

## 2025-02-24 ASSESSMENT — ENCOUNTER SYMPTOMS
CONSTIPATION: 0
BACK PAIN: 0
VOMITING: 0
DIARRHEA: 0
NAUSEA: 0
BLOOD IN STOOL: 0
SHORTNESS OF BREATH: 0
COUGH: 0
ABDOMINAL PAIN: 0
RECTAL PAIN: 0
TROUBLE SWALLOWING: 0

## 2025-02-24 NOTE — PROGRESS NOTES
[facility]           [address], [address]        [place], [place] [address]        O: [phone]        F: [phone]       [facility]            INITIAL CONSULTATION    Date of Service: 2025  Patient ID: Trinidad Smart   : 1950  MRN: [id number]   Acct Number: [id number]         Requesting Provider: Dr. Latricia Jackson (Surgery)  Reason for request: Evaluation for the potential role of adjuvant radiation therapy.     CONSULTANT: Nicole Boykin PA-C    CHIEF COMPLAINT: Evaluation for the potential role of adjuvant radiation therapy.    ASSESSMENT:   Cancer Staging   Malignant neoplasm of upper-outer quadrant of right breast in female, estrogen receptor positive (HCC)  Staging form: Breast, AJCC 8th Edition  - Clinical stage from 2025: Stage IA (cT1, cN0, cM0, G1, ER+, MT+, HER2-) - Signed by Latricia Jackson MD on 2025  - Pathologic stage from 2025: Stage IA (pT1, pN0, cM0, G1, ER+, MT+, HER2-) - Signed by Latricia Jackson MD on 2025      PLAN:    With regards to radiation to the right breast, I discussed the possible short-term side effects of skin irritation (causing redness, dryness, or peeling), swelling and tenderness of the right breast, tiredness, low blood counts (causing infection or bleeding) and hair loss in treated area.  Possible long-term side effects discussed included:  change in the cosmetic appearance of the right breast (change in shape, size, and texture of the breast), hyper/hypo-pigmentation of the skin,   scarring of the lung (causing shortness of breath and cough), swelling of the right arm i.e. lymphedema (causing pain), damage to the nerves (causing numbness, weakness, or paralysis) and rib fracture. We discussed the risk of side effects is increased due to history of left breast radiation.    - Trinidad Smart is a 74 y.o. female who presents today for consultation regarding

## 2025-02-25 ENCOUNTER — SOCIAL WORK (OUTPATIENT)
Dept: RADIATION ONCOLOGY | Age: 75
End: 2025-02-25

## 2025-02-25 NOTE — PROGRESS NOTES
Oncology Social Work    Date: 2/25/2025  Time: 1:36 PM  Name: Trinidad Smart  MRN: [id number]     Contact Type: Distress Tool Follow-up    Note:   Situation: This staff called Trinidad Smart via phone support to introduce myself as the Oncology Social Worker.     Background: Trinidad was referred to this staff by the Radiation Dept after a recent appointment here. This staff was calling to review the Distress Thermometer provided during their visit.    Coping Score 0    Areas of Concern Identified    Physical Concern: None selected    Expressive Concern: None selected    Social Concern: None selected    Practical Concern: None selected    Existential Concern: None selected    Assessment: This staff had the opportunity to speak with Trinidad. She seemed pleasant as we discussed the call's purpose was to educate about the services provided by the SW. She had no outstanding concerns and shared that she is coping well at this time.  - The opportunity to complete an Advance Directive was offered since it is not available in Medical Records. After the ACP conversation, she asked that blank copies of the Advance Directive be mailed to her for review. After completing her Special Instructions care choices she will reach out to schedule a time to complete.   - Referred Her to her area [employer] ([facility]) for additional support should she want to pursue that avenue.Additional referral services may be considered should her needs regarding assistance change.    Recommendation: Follow-up will be initiated based on need.  provided my contact information and will remain available for support.        ANA Lees, CATINA, NOHEMY  Oncology Social Worker      Electronically signed by ANA Lees LSW, ACHP-SW on 2/25/2025 at 1:36 PM

## 2025-02-26 NOTE — ANESTHESIA PRE PROCEDURE
"     Follow Up Note     Date: 2025   Patient Name: Americo Davis Jr.  MRN: [id number]  : 1976     Primary Care Provider: Josephine Conroy APRN     Chief Complaint   Patient presents with    Follow-up     After EGD to discuss results     2025  History of Present Illness  The patient is a 48-year-old male here for a follow-up after an EGD to discuss the results.    He reports no complications following the procedure. He has been experiencing difficulty with bowel movements, necessitating the use of senna twice daily, which results in a bowel movement approximately once a week. He has not yet incorporated stool softeners or MiraLAX into his regimen, although he has them available at home. He has been unable to complete the Cologuard test. He continues to take Suboxone, although at a reduced dosage, and iron supplements.    He experiences daily headaches, for which he takes Goody's Headache Powders, consuming approximately 4 doses per day. He has attempted to manage the headaches with ibuprofen and Tylenol, but these do not help. He acknowledges he has been told to avoid NSAIDs and headache powders, but states he cannot tolerate the headaches and \"has to take them\".     Interval History:  2024  Americo Davis Jr. is a 48 y.o. male who is here today for follow up for his peptic ulcer disease and after his recent EGD.  Patient states that he still has a significant upper abdominal burning reflux symptoms and nausea.  He had to take occasional evening dose of PPI.  He developed constipation again has he did not feel as senna.  He denies any black stool or red blood in the stool.  He continues to smoke and is also on Suboxone.     24  This is a 47-year-old male patient with gastroesophageal reflux disease.  Esophagitis, prior history of peptic ulcer disease, chronic deficiency anemia, long-term NSAID use, COPD, hypertension, hyperlipidemia, coronary artery disease, anxiety " Department of Anesthesiology  Preprocedure Note       Name:  Trinidad Smart   Age:  74 y.o.  :  1950                                          MRN:  [id number]         Date:  2025      Surgeon: Surgeon(s):  Latricia Jackson MD    Procedure: Procedure(s):  Right breast lumpectomy with preop needle localization    Medications prior to admission:   Prior to Admission medications    Medication Sig Start Date End Date Taking? Authorizing Provider   amoxicillin-clavulanate (AUGMENTIN) 875-125 MG per tablet Take 1 tablet by mouth 2 times daily for 10 days 25 Yes Latricia Jackson MD   Calcium Citrate-Vitamin D (CALCIUM + VIT D, BARIATRIC ADVANTAGE, CHEWABLE TABLET) Take 1 tablet by mouth 2 times daily 25 Yes Ranjan Yeager MD   amLODIPine (NORVASC) 10 MG tablet Take 1 tablet by mouth daily 3/26/24  Yes Leopold, Katelyn Ann, MD   losartan (COZAAR) 100 MG tablet Take 1 tablet by mouth daily 3/26/24  Yes Leopold, Katelyn Ann, MD   metoprolol succinate (TOPROL XL) 200 MG extended release tablet Take 1 tablet by mouth daily 3/26/24  Yes Leopold, Katelyn Ann, MD   simvastatin (ZOCOR) 20 MG tablet Take 1 tablet by mouth nightly 3/26/24  Yes Leopold, Katelyn Ann, MD   Cholecalciferol (VITAMIN D3) 125 MCG (5000 UT) TABS Take by mouth daily   Yes Darleen Muhammad MD   Melatonin 5 MG CAPS Take by mouth nightly as needed   Yes Darleen Muhammad MD   penicillin v potassium (VEETID) 500 MG tablet TAKE 1 TABLET BY MOUTH 4 TIMES DAILY UNTIL GONE  Patient not taking: Reported on 2025 1/10/25   Darleen Muhammad MD   AZELASTINE HCL OP     Darleen Muhammad MD   fluticasone (FLONASE) 50 MCG/ACT nasal spray 2 sprays by Each Nostril route daily    Darleen Muhammad MD   guaiFENesin (MUCUS RELIEF ADULT PO) Take 1,200 mg by mouth in the morning and at bedtime    Darleen Muhammad MD       Current medications:    Current Facility-Administered Medications   Medication Dose  depression, was brought in emergency room this morning with a complaints of worsening abdominal pain and an episode of bright red rectal bleeding this morning     Patient just had a morphine for his abdominal pain and is very lethargic and drowsy unable to get any information from him.  His mom is at bedside and of history obtained.  She brought him to the emergency room this morning with worsening abdominal pain and episode of bright red rectal bleed this morning and associated with nausea vomiting without any blood in the vomitus..  He does have a ongoing nausea vomiting which is chronic.  Does have a chronic abdominal pain mostly mid and upper abdomen but has worsened in the last couple of weeks.  He also had bowel movement with small amount of red blood this morning.  There was no black stools.     He had a multiple EGDs done in the last 2 to 3 years time.  Patient has been taking ibuprofen, Aleve, Excedrin despite recommended to stop.  He continues to smoke.  He continues to drink excessive pops without changing his diet.  Seen in the office 2 weeks ago for the similar symptoms.  His prior EGDs revealed erosive esophagitis and also recurrent peptic ulcer disease.  Last EGD wasIn January 2024 that again revealed erosive gastropathy with a healed ulcer scars.  Patient's last colonoscopy was in 2020 and had polyps removed.  Present note.     4/11/2024  Americo Davis Jr. is a 47 y.o. male who is here today for follow up for his ongoing abdominal pain nausea vomiting.  He states that he continues to have a worsening abdominal pain mostly in the upper abdomen and the left side abdomen with nausea and vomiting.  He could not stop his NSAIDs he is still taking ibuprofen Aleve and Excedrin as needed for his migraine headache.  He continues to smoke.  Continues to drink excessive pops.  He did not change any of his diet.  States that he had to come to emergency room in December and Protonix given is not working for  "him.    Subjective      Past Medical History:   Diagnosis Date    Anemia     Anxiety     Asthma     Back pain     Bleeding ulcer 04/2024    Body piercing 10/06/2021    ears    CHF (congestive heart failure)     Reported by patient's wife    Chronic venous insufficiency     Colitis     Constipation     COPD (chronic obstructive pulmonary disease)     Dolphin or callus     Coronary artery disease     Depression     Esophagitis, erosive     followed by Dr. Graff    GERD (gastroesophageal reflux disease)     GI bleed     secondary to gastric ulcer - hospitalized 4/18-4/20/24    Gout     Headache     History of ASCVD (atherosclerotic cardiovascular disease)     History of blood transfusion     Spouse reported no reaction to transfuson     History of fracture 10/06/2021    Hx right pinky fracture - did require surgery    History of heart attack     Spouse reported \"2 light heart attacks at age 29\"     History of stomach ulcers     Hyperlipidemia     Hypertension     Migraine headache     Mild aortic valve sclerosis     Mild mitral insufficiency     Spouse reported MVP and that Dr Preciado said not sever enough to warrant surgery    On home oxygen therapy     Spouse reported at 2L/min NC prn    Peptic ulcer disease     chronic    Poor historian 10/06/2021    Snores     Tattoos     Vision problems     Weight loss     Recent Weight Loss Involuntary For Over A Year Or More     Past Surgical History:   Procedure Laterality Date    APPENDECTOMY      COLONOSCOPY      ENDOSCOPY      ENDOSCOPY N/A 11/5/2021    Procedure: ESOPHAGOGASTRODUODENOSCOPY WITH BIOPSY;  Surgeon: Leonor Graff MD;  Location: [facility] ENDOSCOPY;  Service: Gastroenterology;  Laterality: N/A;    ENDOSCOPY N/A 1/19/2023    Procedure: ESOPHAGOGASTRODUODENOSCOPY WITH BIOPSIES;  Surgeon: Leonor Graff MD;  Location: [facility] ENDOSCOPY;  Service: Gastroenterology;  Laterality: N/A;    ENDOSCOPY N/A 4/19/2024    Procedure: ESOPHAGOGASTRODUODENOSCOPY with " biopsy;  Surgeon: Leonor Graff MD;  Location: [facility] ENDOSCOPY;  Service: Gastroenterology;  Laterality: N/A;    ENDOSCOPY N/A 11/20/2024    Procedure: Esophagogastroduodenscopy with biopsy;  Surgeon: Leonor Graff MD;  Location: [facility] ENDOSCOPY;  Service: Gastroenterology;  Laterality: N/A;    FRACTURE SURGERY Right     Pinky finger    WISDOM TOOTH EXTRACTION       Family History   Problem Relation Age of Onset    Arthritis Mother     Thyroid disease Mother     Hypertension Mother     Migraines Sister     Thyroid disease Sister     Hypertension Sister     Mental illness Brother     Cancer Paternal Uncle     Arthritis Maternal Grandmother     Stroke Maternal Grandmother     Colon cancer Neg Hx     Cirrhosis Neg Hx     Liver cancer Neg Hx     Liver disease Neg Hx      Social History     Socioeconomic History    Marital status:    Tobacco Use    Smoking status: Every Day     Current packs/day: 0.50     Average packs/day: 0.5 packs/day for 34.2 years (17.1 ttl pk-yrs)     Types: Cigarettes     Start date: 1991     Passive exposure: Current    Smokeless tobacco: Current     Types: Snuff   Vaping Use    Vaping status: Some Days    Substances: Nicotine (Spouse reported no use in 2 years), Flavoring    Devices: Disposable   Substance and Sexual Activity    Alcohol use: Not Currently     Comment: social    Drug use: Not Currently     Comment: opiates, snort    Sexual activity: Defer       Current Outpatient Medications:     buprenorphine-naloxone (SUBOXONE) 8-2 MG per SL tablet, Place 2 tablets under the tongue Daily., Disp: , Rfl:     cyclobenzaprine (FLEXERIL) 10 MG tablet, Take 1 tablet by mouth 3 times a day., Disp: , Rfl:     famotidine (PEPCID) 40 MG tablet, Take 1 tablet by mouth Every Night., Disp: 90 tablet, Rfl: 3    ferrous gluconate (FERGON) 324 MG tablet, Take 1 tablet by mouth Daily With Breakfast., Disp: 90 tablet, Rfl: 3    hydroCHLOROthiazide 12.5 MG tablet, Take 1 tablet by mouth  "Daily., Disp: , Rfl:     hydrOXYzine (ATARAX) 25 MG tablet, Take 1 tablet by mouth every night at bedtime., Disp: , Rfl:     losartan (COZAAR) 50 MG tablet, , Disp: , Rfl:     metoprolol succinate XL (TOPROL-XL) 25 MG 24 hr tablet, Take 1 tablet by mouth Daily., Disp: , Rfl:     naloxone (NARCAN) 4 MG/0.1ML nasal spray, 1 spray into the nostril(s) as directed by provider As Needed (.). use for oversedation and call 911, Disp: 1 each, Rfl: 2    nicotine (NICODERM CQ) 21 MG/24HR patch, Place 1 patch on the skin as directed by provider Daily., Disp: 14 each, Rfl: 0    ondansetron (Zofran) 4 MG tablet, Take 1 tablet by mouth Every 8 (Eight) Hours As Needed for Nausea., Disp: 30 tablet, Rfl: 1    pantoprazole (PROTONIX) 40 MG EC tablet, Take 1 tablet by mouth Daily. Taking 2 per day, Disp: 90 tablet, Rfl: 3    senna 8.6 MG tablet, Take 2 tablets by mouth Every Night., Disp: 180 tablet, Rfl: 3    bisacodyl (DULCOLAX) 5 MG EC tablet, Take as directed for colon prep, Disp: 4 tablet, Rfl: 0    PEG-KCl-NaCl-NaSulf-Na Asc-C (MOVIPREP) 100 g reconstituted solution powder, Take 1,000 mL by mouth Take As Directed. Take as directed for colonoscopy prep., Disp: 1 each, Rfl: 0    Allergies   Allergen Reactions    Sulfa Antibiotics Other (See Comments)     \"causes his insides to burn and his skin gets really red\" reported by patient's spouse      The following portions of the patient's history were reviewed and updated as appropriate: allergies, current medications, past family history, past medical history, past social history, past surgical history and problem list.  Objective     Physical Exam  Vitals and nursing note reviewed.   Constitutional:       General: He is not in acute distress.     Appearance: Normal appearance. He is well-developed.   HENT:      Head: Normocephalic and atraumatic.      Mouth/Throat:      Mouth: Mucous membranes are not pale, not dry and not cyanotic.   Eyes:      General: Lids are normal.   Neck:      " Trachea: Trachea normal.   Cardiovascular:      Rate and Rhythm: Normal rate.   Pulmonary:      Effort: Pulmonary effort is normal. No respiratory distress.      Breath sounds: Normal breath sounds.   Abdominal:      Tenderness: There is no abdominal tenderness.   Skin:     General: Skin is warm and dry.   Neurological:      Mental Status: He is alert and oriented to person, place, and time.   Psychiatric:         Mood and Affect: Mood normal.         Speech: Speech normal.         Behavior: Behavior normal. Behavior is cooperative.       Vitals:    25 1604   BP: 120/84   Pulse: 72   SpO2: 97%   Weight: 64 kg (141 lb)     Body mass index is 20.23 kg/m².     Results Review:   I reviewed the patient's new clinical results.    No visits with results within 90 Day(s) from this visit.   Latest known visit with results is:   Admission on 2024, Discharged on 2024   Component Date Value Ref Range Status    Reference Lab Report 2024    Final                    Value:[facility]  [address] [place] [address]  Phone: [phone] or [phone]  Fax: [phone]  Addi Cox M.D., Medical Director    PATIENT NAME                                     LABORATORY NO.  [id number]   TAYLOR OHARA JR                          [id number]  [id number]                                 AGE                    SEX   N              CLIENT REF #  [facility]                    48        1976           [id number]      [id number]    [address]                        REQUESTING M.D.           ATTENDING M.D.         COPY TO.   [address]                                [place] [address]                         MALIKA MATOS  DATE COLLECTED            DATE RECEIVED          DATE REPORTED  2024    DIAGNOSIS:  A.     ANTRUM AND BODY,  BIOPSY:  Reactive gastropathy.  No H. pylori    organisms are identified on H&E-stained sections.  B.     DUODENUM, BIOPSY, PYLORIC ULCER:  Superficial portions of small intestinal mucosa with pyloric metaplasia  and fragments of ulcer slough.  No evidence of intraepithelial lymphocytosis.       REVIEWED, DIAGNOSED AND ELECTRONICALLY  SIGNED BY:    Rick Valdez M.D., F.C.A.P.  CPT CODES:  88305x2        Comprehensive Metabolic Panel (01/25/2023 12:40)  CBC & Differential (01/25/2023 12:40)  Hepatitis Panel, Acute (01/25/2023 12:40)  HIV-1 / O / 2 Ag / Antibody 4th Generation (01/25/2023 12:40)  TSH (01/25/2023 12:40)    CBC Auto Differential (11/07/2024 17:21)      CTAP without contrast 4/18/2024  1. No obvious inflammatory changes or bowel wall thickening.  2. No evidence of bowel obstruction  3. Nonobstructing right renal stones    EGD dated 11/5/2021 per Dr. Graff  - Normal oropharynx.  - Z-line irregular, 40 cm from the incisors.  - LA Grade A reflux esophagitis with no bleeding.  - Esophageal small cyst was found.  - Erosive gastropathy with stigmata of recent bleeding. Biopsied.  - Atrophic, discolored and texture changed mucosa in the antrum. Biopsied.  - Multiple healed ulcer scar in the gastric body, in the incisura and in the gastric antrum.  - Normal duodenal bulb, first portion of the duodenum, second portion of the duodenum and third portion of the duodenum.  - Anemia improved, no current bleeding  -Gastric biopsy with mild foveolar hyperplasia and PPI effect.  No H. pylori.  Gastric antrum biopsy of abnormal mucosa with reactive gastropathy, no H. pylori.     EGD dated 1/19/2023 per Dr. Graff  - Normal oropharynx.  - Z-line irregular, 40 cm from the incisors.  - [place]-colored mucosa suspicious for short-segment Mg's esophagus. Biopsied.  - Erosive gastropathy with stigmata of recent bleeding. Biopsied.  - Non-bleeding gastric ulcers with no stigmata of bleeding.  - Normal duodenal bulb,  first portion of the duodenum, second portion of the duodenum and third portion of the duodenum.  - Findings suggestive of NSAID induced gastropathy, PUD  A.   ANTRUM AND BODY, BIOPSY:   Reactive gastropathy.   Negative for intestinal metaplasia or dysplasia.   No H. pylori-like organisms identified on H&E.   B.   GE JUNCTION:   Reactive gastric mucosa; negative for intestinal metaplasia, dysplasia and   malignancy.   No squamous esophageal mucosa is identified.     EGD 1/19/2024 per Dr. Graff  - Normal oropharynx.   - Z-line irregular, 40 cm from the incisors.   - [place]-colored mucosa suspicious for short-segment Mg's esophagus. Biopsied.   - Erosive gastropathy with stigmata of recent bleeding. Biopsied.   - Non-bleeding gastric ulcers with no stigmata of bleeding.   - Normal duodenal bulb, first portion of the duodenum, second portion of the duodenum and third portion of the duodenum.   - Findings sugestive of NSAID induced gastropathy, PUD  A. ANTRUM AND BODY, BIOPSY:  Reactive gastropathy.  Negative for intestinal metaplasia or dysplasia.  No H. pylori-like organisms identified on H&E.  B. GE JUNCTION:  Reactive gastric mucosa; negative for intestinal metaplasia, dysplasia and malignancy.  No squamous esophageal mucosa is identified.     EGD on 4/19/2024 per Dr. Graff  - Normal oropharynx.   - Z-line variable, 40 cm from the incisors.   - 2 cm hiatal hernia.   - Erosive gastropathy with no stigmata of recent bleeding. Biopsied.   - Large Non-bleeding gastric ulcer at pyloric canal with a clean ulcer base ([place] Class III). - Normal duodenal bulb, first portion of the duodenum, second portion of the duodenum and th  - Normal duodenal bulb, first portion of the duodenum, second portion of the duodenum and third portion of the duodenum.   - High risk for peptic perforation   - No current signs of GI bleeding  ANTRUM AND BODY, BIOPSY:  Minimal to focally mild chronic inactive gastritis and reactive  epithelial changes  No Helicobacter microorganisms identified on H&E stain  No identified intestine metaplasia, dysplasia or malignancy    EGD 11/20/2024 per Dr. Graff  - Normal oropharynx.  - Z-line irregular, 40 cm from the incisors.  - 1 cm hiatal hernia.  - LA Grade A reflux esophagitis with no bleeding. Biopsied.  - A small amount of food (residue) in the stomach.  - Erosive gastropathy with no stigmata of recent bleeding. Biopsied.  - Non-bleeding previously known chronic gastric ulcer (pyloric canal) with a clean ulcer base (Kalia Class III). Biopsied. Slightly improved, clinically benign.  - PUD likely from continued NSAID use and smoking  - Normal duodenal bulb, first portion of the duodenum, second portion of the duodenum and third portion of the duodenum.  - Suspected mild gastroparesis ?? opioid induced  A.     ANTRUM AND BODY, BIOPSY:  Reactive gastropathy.  No H. pylori organisms are identified on H&E-stained sections.  B.     DUODENUM, BIOPSY, PYLORIC ULCER:  Superficial portions of small intestinal mucosa with pyloric metaplasia  and fragments of ulcer slough.  No evidence of intraepithelial lymphocytosis.     Assessment / Plan      1. Chronic PUD (peptic ulcer disease) with GI bleeding  2. Iron deficiency anemia due to chronic blood loss  3. Esophagitis  4. Gastroparesis  5. Constipation, unspecified constipation type  6. Chronic functional nausea complicated by opioid use   7.  Long-term opioid use and NSAID use  8.  Opioid-induced constipation   He admits he continues to take Goody's headache powders about 4 times per day for chronic headaches.  Patient denies any episodes of GI bleeding.  He continues to have constipation and recently started senna, he has not taken MiraLAX or stool softeners.  Labs dated 11/7/2024 with hemoglobin 11.4.  CTAP without contrast 4/18/2024 with mild fecal impaction, otherwise unremarkable.  He has chronic peptic ulcer disease, EGD dated 11/20/2024 with a small  amount of food residue noted in the stomach, erosive gastropathy with no stigmata of recent bleeding and nonbleeding previously known chronic gastric ulcer (pyloric canal) with a clean-based ulcer (Kalia class III).  Slightly improved, clinically benign.  Biopsies with superficial portions of small intestinal mucosa with pyloric metaplasia, fragments of ulcer slough.  Peptic ulcer disease likely from continued NSAID use and smoking.  Suspected mild gastroparesis from opioid use.    Continue pantoprazole 40 mg in the a.m. and Pepcid 40 mg at in the evening due to significant e night symptoms.  Antireflux measures, continue to work on stopping smoking.  Recommend cutting down on opioids  Continue Senna, Miralax and stool softeners daily  Consider Linzess or Movantik if no improvement.  Continue iron pills daily  Advised to avoid all NSAIDs/headache powders.  May use acetaminophen/Tylenol products.  Patient needs further evaluation of chronic headaches to find out cause and get appropriate treatment.  Suspect he is now getting rebound headaches from chronic headache powders/NSAID use.  Patient states he will discuss further with his PCP.  Repeat EGD in 6 months for surveillance.    Case Request    9. Personal history of colon polyps, unspecified  As per patient he may have had a colonoscopy done about 4-5 years ago at [place] details unknown. He thinks he had a polyps removed. He was scheduled for colonoscopy in May 2024 and he did not keep up the appointment.  He is agreeable to reschedule colonoscopy.  Colonoscopy for surveillance.    - Case Request  - PEG-KCl-NaCl-NaSulf-Na Asc-C (MOVIPREP) 100 g reconstituted solution powder; Take 1,000 mL by mouth Take As Directed. Take as directed for colonoscopy prep.  Dispense: 1 each; Refill: 0  - bisacodyl (DULCOLAX) 5 MG EC tablet; Take as directed for colon prep  Dispense: 4 tablet; Refill: 0    Patient Instructions   Antireflux measures: Avoid fried, fatty foods,  alcohol, chocolate, coffee, tea,  soft drinks, peppermint and spearmint, spicy foods, tomatoes and tomato based foods, onions, peppers, and smoking.   Other antireflux measures include weight reduction if overweight, avoiding tight clothing around the abdomen, elevating the head of the bed 6 inches with blocks under the head board, and don't drink or eat before going to bed and avoid lying down immediately after meals.  Avoid vaping/smoking.  Pantoprazole 40 mg 1 by mouth in the am 30 minutes before breakfast.  Zofran 4 mg 1 po every 8 hours as needed for nausea.   Avoid all NSAIDs, including Ibuprofen, Motrin, Advil, Aleve, Naprosyn, etc.   Do not take Excedrin, headache powders, etc.   May use Tylenol/Acetaminophen products if needed.   Patient needs further evaluation of his headaches/migraines - discuss with PCP for further recommendations.   The patient should eat 4-5 very small meals throughout the day. Avoid large meals.  It is recommended to eat a softer diet. Meats are best consumed ground. Fruits and vegetables are best consumed cooked or steamed and then mashed.   Miralax 17 grams daily.   Stool softeners 2 per day.   Senna daily.   Colonoscopy: The indications, technique, alternatives and potential risk and complications were discussed with the patient including but not limited to bleeding, perforations, missing lesions and anesthetic complications. The patient understands and wishes to proceed with the procedure and has given their verbal consent. Written patient education information was given to the patient.   The patient will call if they have further questions before procedure.   Upper endoscopy-EGD: The indications, technique, alternatives and potential risk and complications were discussed with the patient including but not limited to bleeding, perforations, missing lesions and anesthetic complications. The patient understands and wishes to proceed with the procedure and has given their verbal  consent. Written patient education information was given to the patient.   The patient will call if they have further questions before procedure.     DENNY Sims  2/26/2025    Please note that portions of this note were completed with a voice recognition program.     Patient or patient representative verbalized consent for the use of Ambient Listening during the visit with  DENNY Sims for chart documentation. 2/26/2025  16:42 EST

## 2025-03-09 ENCOUNTER — PATIENT MESSAGE (OUTPATIENT)
Dept: FAMILY MEDICINE CLINIC | Age: 75
End: 2025-03-09

## 2025-03-12 ENCOUNTER — HOSPITAL ENCOUNTER (OUTPATIENT)
Dept: CT IMAGING | Age: 75
Discharge: HOME | End: 2025-03-12

## 2025-03-12 ENCOUNTER — HOSPITAL ENCOUNTER (OUTPATIENT)
Dept: RADIATION ONCOLOGY | Age: 75
Discharge: HOME | End: 2025-03-12
Payer: COMMERCIAL

## 2025-03-12 DIAGNOSIS — C50.411 CARCINOMA OF UPPER-OUTER QUADRANT OF FEMALE BREAST, RIGHT (HCC): ICD-10-CM

## 2025-03-12 PROCEDURE — 77290 THER RAD SIMULAJ FIELD CPLX: CPT | Performed by: RADIOLOGY

## 2025-03-12 PROCEDURE — 77334 RADIATION TREATMENT AID(S): CPT | Performed by: RADIOLOGY

## 2025-03-12 PROCEDURE — 3209999900 CT GUIDE RADIATION THERAPY NO CHARGE

## 2025-03-18 ENCOUNTER — OFFICE VISIT (OUTPATIENT)
Dept: SURGERY | Age: 75
End: 2025-03-18

## 2025-03-18 VITALS
BODY MASS INDEX: 30.25 KG/M2 | OXYGEN SATURATION: 98 % | SYSTOLIC BLOOD PRESSURE: 128 MMHG | TEMPERATURE: 97.3 F | HEART RATE: 68 BPM | DIASTOLIC BLOOD PRESSURE: 60 MMHG | HEIGHT: 65 IN

## 2025-03-18 DIAGNOSIS — L53.9 ERYTHEMA: Primary | ICD-10-CM

## 2025-03-18 PROCEDURE — 99024 POSTOP FOLLOW-UP VISIT: CPT | Performed by: SURGERY

## 2025-03-18 RX ORDER — DOXYCYCLINE HYCLATE 100 MG
100 TABLET ORAL 2 TIMES DAILY
Qty: 20 TABLET | Refills: 0 | Status: SHIPPED | OUTPATIENT
Start: 2025-03-18 | End: 2025-03-28

## 2025-03-18 NOTE — PROGRESS NOTES
Latricia Jackson MD  [facility]  General Surgery  Clinic  Note    Pt Name: Trinidad Smart  Medical Record Number: [id number]  Date of Birth 1950   Today's Date: 03/18/2025      ASSESSMENT/ PLAN:     Assessment & Plan  1. Skin discoloration.  Upon examination, there is no significant fluid accumulation or seroma formation in the affected area. A therapeutic approach involving the drainage of any potential fluid has been proposed. Concurrently, an antibiotic regimen will be initiated to manage the condition. She is allergic to sulfa drugs, so an alternative antibiotic will be selected. Doxycycline called in. Patient to call office at end of week if not improving for antibiotic change         SUBJECTIVE     History of Present Illness  The patient presents for evaluation of a pinkish hue on her skin.    She reports that the onset of this condition is uncertain, but it was first observed during a radiology appointment last Wednesday. She describes the area as being slightly congested, akin to a sinus issue, but without any associated pain. She speculates that the condition may have been present for a longer duration due to her limited visual acuity.    ALLERGIES  The patient is allergic to SULFA DRUGS.             CURRENT MEDICATIONS     Current Outpatient Medications on File Prior to Visit   Medication Sig Dispense Refill    Calcium Citrate-Vitamin D (CALCIUM + VIT D, BARIATRIC ADVANTAGE, CHEWABLE TABLET) Take 1 tablet by mouth 2 times daily 60 tablet 0    amLODIPine (NORVASC) 10 MG tablet Take 1 tablet by mouth daily 90 tablet 3    losartan (COZAAR) 100 MG tablet Take 1 tablet by mouth daily 90 tablet 3    metoprolol succinate (TOPROL XL) 200 MG extended release tablet Take 1 tablet by mouth daily 90 tablet 3    simvastatin (ZOCOR) 20 MG tablet Take 1 tablet by mouth nightly 90 tablet 3    AZELASTINE HCL OP       Cholecalciferol (VITAMIN D3) 125 MCG (5000 UT) TABS Take by mouth daily      Melatonin

## 2025-03-20 ENCOUNTER — HOSPITAL ENCOUNTER (OUTPATIENT)
Dept: INFUSION THERAPY | Age: 75
Discharge: HOME | End: 2025-03-20
Payer: COMMERCIAL

## 2025-03-20 ENCOUNTER — OFFICE VISIT (OUTPATIENT)
Dept: ONCOLOGY | Age: 75
End: 2025-03-20
Payer: COMMERCIAL

## 2025-03-20 VITALS
SYSTOLIC BLOOD PRESSURE: 145 MMHG | DIASTOLIC BLOOD PRESSURE: 65 MMHG | HEART RATE: 70 BPM | OXYGEN SATURATION: 95 % | RESPIRATION RATE: 18 BRPM | TEMPERATURE: 98.3 F

## 2025-03-20 VITALS
RESPIRATION RATE: 18 BRPM | SYSTOLIC BLOOD PRESSURE: 145 MMHG | TEMPERATURE: 98.3 F | BODY MASS INDEX: 29.99 KG/M2 | HEART RATE: 70 BPM | HEIGHT: 65 IN | OXYGEN SATURATION: 95 % | DIASTOLIC BLOOD PRESSURE: 65 MMHG | WEIGHT: 180 LBS

## 2025-03-20 DIAGNOSIS — C50.919 LOBULAR CARCINOMA OF BREAST, UNSPECIFIED LATERALITY: ICD-10-CM

## 2025-03-20 DIAGNOSIS — C50.411 MALIGNANT NEOPLASM OF UPPER-OUTER QUADRANT OF RIGHT BREAST IN FEMALE, ESTROGEN RECEPTOR POSITIVE: Primary | ICD-10-CM

## 2025-03-20 DIAGNOSIS — E21.3 HYPERPARATHYROIDISM: ICD-10-CM

## 2025-03-20 DIAGNOSIS — Z17.0 MALIGNANT NEOPLASM OF UPPER-OUTER QUADRANT OF RIGHT BREAST IN FEMALE, ESTROGEN RECEPTOR POSITIVE: Primary | ICD-10-CM

## 2025-03-20 DIAGNOSIS — M85.80 OSTEOPENIA AFTER MENOPAUSE: ICD-10-CM

## 2025-03-20 DIAGNOSIS — Z78.0 OSTEOPENIA AFTER MENOPAUSE: ICD-10-CM

## 2025-03-20 PROCEDURE — 1123F ACP DISCUSS/DSCN MKR DOCD: CPT | Performed by: INTERNAL MEDICINE

## 2025-03-20 PROCEDURE — 3078F DIAST BP <80 MM HG: CPT | Performed by: INTERNAL MEDICINE

## 2025-03-20 PROCEDURE — 3077F SYST BP >= 140 MM HG: CPT | Performed by: INTERNAL MEDICINE

## 2025-03-20 PROCEDURE — 99214 OFFICE O/P EST MOD 30 MIN: CPT | Performed by: INTERNAL MEDICINE

## 2025-03-20 PROCEDURE — 99211 OFF/OP EST MAY X REQ PHY/QHP: CPT

## 2025-03-20 NOTE — PROGRESS NOTES
09:54 AM    NRBC 0 09/26/2024 08:55 AM    MONOPCT 9.9 09/26/2024 08:55 AM    EOSPCT 2 02/20/2025 09:54 AM    MONOSABS 0.6 02/20/2025 09:54 AM    LYMPHSABS 1.5 02/20/2025 09:54 AM    EOSABS 0.2 02/20/2025 09:54 AM    BASOSABS 0.0 02/20/2025 09:54 AM   No components found for: \"SEGSABS\"    CMP:    Lab Results   Component Value Date/Time     02/20/2025 09:54 AM     01/28/2025 01:31 PM    K 4.9 02/20/2025 09:54 AM    K 4.0 01/28/2025 01:31 PM     01/28/2025 01:31 PM    CO2 27 01/28/2025 01:31 PM    BUN 12 01/28/2025 01:31 PM    CREATININE 0.6 02/20/2025 09:54 AM    CREATININE 0.8 01/28/2025 01:31 PM    LABGLOM > 90 02/20/2025 09:54 AM    LABGLOM >60 08/10/2023 08:55 AM    LABGLOM >60 02/09/2023 10:50 AM    GLUCOSE 116 01/28/2025 01:31 PM    CALCIUM 10.7 01/28/2025 01:31 PM    BILITOT 0.5 02/20/2025 09:54 AM    ALKPHOS 169 02/20/2025 09:54 AM    AST 19 02/20/2025 09:54 AM    ALT 15 02/20/2025 09:54 AM       BMP:    Lab Results   Component Value Date/Time     02/20/2025 09:54 AM     01/28/2025 01:31 PM    K 4.9 02/20/2025 09:54 AM    K 4.0 01/28/2025 01:31 PM     01/28/2025 01:31 PM    CO2 27 01/28/2025 01:31 PM    BUN 12 01/28/2025 01:31 PM    CREATININE 0.6 02/20/2025 09:54 AM    CREATININE 0.8 01/28/2025 01:31 PM    CALCIUM 10.7 01/28/2025 01:31 PM    LABGLOM > 90 02/20/2025 09:54 AM    LABGLOM >60 08/10/2023 08:55 AM    LABGLOM >60 02/09/2023 10:50 AM    GLUCOSE 116 01/28/2025 01:31 PM       Magnesium:  No results found for: \"MG\"  PT/INR:  No results found for: \"PROTIME\", \"INR\"  TSH:    Lab Results   Component Value Date/Time    TSH 4.110 08/10/2023 08:55 AM     VITAMIN B12: No components found for: \"B12\"  FOLATE:  No results found for: \"FOLATE\"  IRON:  No results found for: \"IRON\"  Iron Saturation:  No components found for: \"PERCENTFE\"  TIBC:  No results found for: \"TIBC\"  FERRITIN:  No results found for: \"FERRITIN\"  PSA: No results found for: \"PSA\"         IMAGING:    MRI BIOPSY

## 2025-03-20 NOTE — PATIENT INSTRUCTIONS
Return in about 5 weeks (around 4/24/2025).   Data Unavailable   No orders of the defined types were placed in this encounter.

## 2025-03-21 ENCOUNTER — HOSPITAL ENCOUNTER (OUTPATIENT)
Dept: RADIATION ONCOLOGY | Age: 75
End: 2025-03-21
Payer: COMMERCIAL

## 2025-03-21 ENCOUNTER — TELEPHONE (OUTPATIENT)
Dept: SURGERY | Age: 75
End: 2025-03-21

## 2025-03-21 PROCEDURE — 77334 RADIATION TREATMENT AID(S): CPT | Performed by: RADIOLOGY

## 2025-03-21 RX ORDER — CEPHALEXIN 500 MG/1
500 CAPSULE ORAL 2 TIMES DAILY
Qty: 20 CAPSULE | Refills: 0 | Status: SHIPPED | OUTPATIENT
Start: 2025-03-21 | End: 2025-03-31

## 2025-03-21 NOTE — TELEPHONE ENCOUNTER
Patient called to office with concerns regarding breast seroma. Patient seen in office on 3/18/25 and started on Doxycycline. Per patient no improvement with antibiotics, inquiring about recommendations?     Case discussed with Dr. Jackson, Keflex sent for 10 days, continue to follow up as scheduled on 3/25/25

## 2025-03-25 ENCOUNTER — OFFICE VISIT (OUTPATIENT)
Dept: SURGERY | Age: 75
End: 2025-03-25

## 2025-03-25 VITALS
WEIGHT: 180 LBS | OXYGEN SATURATION: 94 % | BODY MASS INDEX: 29.99 KG/M2 | HEIGHT: 65 IN | SYSTOLIC BLOOD PRESSURE: 124 MMHG | DIASTOLIC BLOOD PRESSURE: 60 MMHG | TEMPERATURE: 97.7 F | HEART RATE: 74 BPM

## 2025-03-25 DIAGNOSIS — L53.9 ERYTHEMA: Primary | ICD-10-CM

## 2025-03-25 PROCEDURE — 99024 POSTOP FOLLOW-UP VISIT: CPT | Performed by: SURGERY

## 2025-03-25 RX ORDER — METRONIDAZOLE 500 MG/1
500 TABLET ORAL 3 TIMES DAILY
Qty: 30 TABLET | Refills: 0 | Status: SHIPPED | OUTPATIENT
Start: 2025-03-25 | End: 2025-04-04

## 2025-03-25 RX ORDER — LEVOFLOXACIN 500 MG/1
500 TABLET, FILM COATED ORAL DAILY
Qty: 10 TABLET | Refills: 0 | Status: SHIPPED | OUTPATIENT
Start: 2025-03-25 | End: 2025-04-04

## 2025-03-31 ENCOUNTER — HOSPITAL ENCOUNTER (OUTPATIENT)
Dept: RADIATION ONCOLOGY | Age: 75
Discharge: HOME | End: 2025-03-31
Payer: COMMERCIAL

## 2025-04-01 ENCOUNTER — OFFICE VISIT (OUTPATIENT)
Dept: SURGERY | Age: 75
End: 2025-04-01

## 2025-04-01 VITALS
OXYGEN SATURATION: 97 % | TEMPERATURE: 97.8 F | HEART RATE: 77 BPM | DIASTOLIC BLOOD PRESSURE: 64 MMHG | RESPIRATION RATE: 18 BRPM | SYSTOLIC BLOOD PRESSURE: 136 MMHG

## 2025-04-01 DIAGNOSIS — Z09 POSTOP CHECK: Primary | ICD-10-CM

## 2025-04-01 PROCEDURE — 99024 POSTOP FOLLOW-UP VISIT: CPT | Performed by: SURGERY

## 2025-04-01 NOTE — PROGRESS NOTES
[facility]  General Surgery  Clinic  Note    Pt Name: Trinidad Smart  Medical Record Number: [id number]  Date of Birth 1950   Today's Date: 03/25/2025      ASSESSMENT/ PLAN:     Assessment & Plan  1. Infection.  The cellulitis , indicating a lack of significant improvement. The incision site appears to be healing well. A regimen of Levaquin and Flagyl, to be administered twice daily for a duration of 10 days, has been prescribed. She has been advised to discontinue her current antibiotics and commence the new ones today. She may continue with her probiotic intake 2 hours post-antibiotic administration. If the infection resolves completely, she has the option to cancel her appointment scheduled for next week.    Follow-up  The patient is scheduled for a follow-up visit next week.         SUBJECTIVE     History of Present Illness  The patient presents for evaluation of an infection.    She reports a slight improvement in her condition. She has been on a course of antibiotics, which she completed on Sunday. She is currently taking probiotics 2 hours post-antibiotic administration. She has been on a course of antibiotics, which she completed on Sunday. She is currently taking probiotics 2 hours post-antibiotic administration.    ALLERGIES  The patient is allergic to SULFA DRUGS.    MEDICATIONS  Current: doxycycline, Keflex, probiotic       CURRENT MEDICATIONS     Current Outpatient Medications on File Prior to Visit   Medication Sig Dispense Refill    amLODIPine (NORVASC) 10 MG tablet Take 1 tablet by mouth daily 90 tablet 3    losartan (COZAAR) 100 MG tablet Take 1 tablet by mouth daily 90 tablet 3    metoprolol succinate (TOPROL XL) 200 MG extended release tablet Take 1 tablet by mouth daily 90 tablet 3    simvastatin (ZOCOR) 20 MG tablet Take 1 tablet by mouth nightly 90 tablet 3    AZELASTINE HCL OP       Cholecalciferol (VITAMIN D3) 125 MCG (5000 UT) TABS Take by mouth

## 2025-04-03 DIAGNOSIS — I10 PRIMARY HYPERTENSION: ICD-10-CM

## 2025-04-03 DIAGNOSIS — E78.5 HYPERLIPIDEMIA, UNSPECIFIED HYPERLIPIDEMIA TYPE: ICD-10-CM

## 2025-04-03 RX ORDER — AMLODIPINE BESYLATE 10 MG/1
10 TABLET ORAL DAILY
Qty: 90 TABLET | Refills: 0 | Status: SHIPPED | OUTPATIENT
Start: 2025-04-03

## 2025-04-03 RX ORDER — METOPROLOL SUCCINATE 200 MG/1
200 TABLET, EXTENDED RELEASE ORAL DAILY
Qty: 90 TABLET | Refills: 0 | Status: SHIPPED | OUTPATIENT
Start: 2025-04-03

## 2025-04-03 RX ORDER — SIMVASTATIN 20 MG
20 TABLET ORAL NIGHTLY
Qty: 90 TABLET | Refills: 0 | Status: SHIPPED | OUTPATIENT
Start: 2025-04-03

## 2025-04-03 NOTE — TELEPHONE ENCOUNTER
This medication refill is regarding a electronic request.  Refill requested by patient.    Requested Prescriptions     Pending Prescriptions Disp Refills    metoprolol succinate (TOPROL XL) 200 MG extended release tablet [Pharmacy Med Name: Metoprolol Succinate  MG Oral Tablet Extended Release 24 Hour] 90 tablet 0     Sig: Take 1 tablet by mouth once daily    simvastatin (ZOCOR) 20 MG tablet [Pharmacy Med Name: Simvastatin 20 MG Oral Tablet] 90 tablet 0     Sig: Take 1 tablet by mouth nightly    amLODIPine (NORVASC) 10 MG tablet [Pharmacy Med Name: amLODIPine Besylate 10 MG Oral Tablet] 90 tablet 0     Sig: Take 1 tablet by mouth once daily       Date of last visit: 10/29/2024  Date of next visit: Visit date not found  Date of last refill: 03/26/2024  Pharmacy Name: [facility]

## 2025-04-03 NOTE — TELEPHONE ENCOUNTER
She should probably be seen since it has been 6 months since last visit.  I will send 90 day supply of meds.    This is Dr. Jelena Goetz covering Dr. Leopold's messages today.

## 2025-04-08 ENCOUNTER — OFFICE VISIT (OUTPATIENT)
Dept: SURGERY | Age: 75
End: 2025-04-08

## 2025-04-08 VITALS
HEART RATE: 81 BPM | HEIGHT: 65 IN | OXYGEN SATURATION: 97 % | BODY MASS INDEX: 29.95 KG/M2 | DIASTOLIC BLOOD PRESSURE: 62 MMHG | SYSTOLIC BLOOD PRESSURE: 138 MMHG | TEMPERATURE: 97.4 F

## 2025-04-08 DIAGNOSIS — Z09 POSTOP CHECK: ICD-10-CM

## 2025-04-08 DIAGNOSIS — Z12.31 SCREENING MAMMOGRAM FOR BREAST CANCER: Primary | ICD-10-CM

## 2025-04-08 DIAGNOSIS — Z85.3 HISTORY OF BREAST CANCER: ICD-10-CM

## 2025-04-08 PROCEDURE — 99024 POSTOP FOLLOW-UP VISIT: CPT | Performed by: SURGERY

## 2025-04-08 NOTE — PROGRESS NOTES
Latricia Jackson MD  [facility]  General Surgery  Clinic  Note    Pt Name: Trinidad Smart  Medical Record Number: [id number]  Date of Birth 1950   Today's Date: 04/08/2025      ASSESSMENT/ PLAN:     Assessment & Plan  1. Breast cancer.- erythema most consistent with lymphedema   Her symptoms are likely due to a reduction in lymphatic flow. She has been advised to continue with her arm exercises and massage therapy. She can stop taking ibuprofen. A consultation with Dr. Wild will be arranged to discuss her condition. She is scheduled for radiation therapy before the summer. A mammogram will be conducted one year after her initial mammogram.    Will try to get in contact with Dr. Wild.  I feel the patient is safe to start radiation therapy.          Cancer Staging   Malignant neoplasm of upper-outer quadrant of right breast in female, estrogen receptor positive  Staging form: Breast, AJCC 8th Edition  - Clinical stage from 1/7/2025: Stage IA (cT1, cN0, cM0, G1, ER+, IN+, HER2-) - Signed by Latricia Jackson MD on 1/17/2025  - Pathologic stage from 2/6/2025: Stage IA (pT1, pN0, cM0, G1, ER+, IN+, HER2-) - Signed by Latricia Jackson MD on 2/6/2025    SUBJECTIVE     History of Present Illness  The patient presents for evaluation of breast cancer.    She reports an improvement in her condition, attributing it to the implementation of massage therapy and arm exercises. These interventions have been beneficial, with the pain occasionally subsiding completely. She is scheduled for a consultation with Dr. Wild on the upcoming Monday. She expresses a desire to discontinue the use of ibuprofen and to complete her radiation therapy prior to the summer season.    MEDICATIONS  ibuprofen    CURRENT MEDICATIONS     Current Outpatient Medications on File Prior to Visit   Medication Sig Dispense Refill    metoprolol succinate (TOPROL XL) 200 MG extended release tablet Take 1 tablet by mouth once daily 90

## 2025-04-08 NOTE — PROGRESS NOTES
Latricia Jackson MD  [facility]  General Surgery  Clinic  Note    Pt Name: Trinidad Smart  Medical Record Number: [id number]  Date of Birth 1950   Today's Date: 04/01/2025      ASSESSMENT/ PLAN:     Assessment & Plan  1. Erythema of the breast.  The condition does not exhibit typical signs of infection, leading to a suspicion of potential lymphedema. She is currently on Levaquin and Flagyl, and has previously tried Keflex and doxycycline. She is advised to continue her current antibiotic regimen and incorporate ibuprofen 600 mg three times daily with meals into her treatment plan. Additionally, she is encouraged to perform daily breast massages to facilitate lymphatic drainage and lymphedema excercised.  A referral to an infectious disease specialist will be made for further evaluation and potential alternative antibiotic recommendations.    Follow-up  The patient will follow up in 1 week.         SUBJECTIVE     History of Present Illness  The patient presents for evaluation of breast redness.    She reports no pain or discomfort in the affected area, which was initially identified during a radiation therapy session. The area of concern is not visible to her unless she elevates her breast. Her radiation oncologist, Dr. Wild, has advised against further radiation until the condition improves. She has been experiencing mild diarrhea, which she manages with nightly probiotics and daytime yogurt consumption. She has no history of antibiotic-resistant bacteria. She is not currently on any anticoagulant therapy. She has been on Levaquin and Flagyl, and has tried Keflex and doxycycline. She has a known allergy to SULFA, which previously resulted in a pink discoloration of her face and trunk after 7 days of use.    ALLERGIES  The patient is allergic to SULFA.    MEDICATIONS  Current: Levaquin, Flagyl, probiotic, yogurt  Past: Keflex, doxycycline          Review of Systems    CURRENT MEDICATIONS

## 2025-04-14 ENCOUNTER — HOSPITAL ENCOUNTER (OUTPATIENT)
Dept: RADIATION ONCOLOGY | Age: 75
Discharge: HOME | End: 2025-04-14
Payer: COMMERCIAL

## 2025-04-14 DIAGNOSIS — C50.411 MALIGNANT NEOPLASM OF UPPER-OUTER QUADRANT OF RIGHT FEMALE BREAST, UNSPECIFIED ESTROGEN RECEPTOR STATUS: Primary | ICD-10-CM

## 2025-04-14 PROCEDURE — 77280 THER RAD SIMULAJ FIELD SMPL: CPT | Performed by: RADIOLOGY

## 2025-04-14 PROCEDURE — 77336 RADIATION PHYSICS CONSULT: CPT | Performed by: RADIOLOGY

## 2025-04-14 PROCEDURE — 77412 RADIATION TX DELIVERY LVL 3: CPT | Performed by: RADIOLOGY

## 2025-04-14 RX ORDER — BETAMETHASONE VALERATE 1.2 MG/G
CREAM TOPICAL
Qty: 90 G | Refills: 0 | Status: SHIPPED | OUTPATIENT
Start: 2025-04-14

## 2025-04-14 NOTE — PROGRESS NOTES
[facility]  [address] [id number]  Phone: [phone]   Toll Free: [phone]   Fax: [phone]    RADIATION ONCOLOGY EDUCATION    CHIEF COMPLAINT: Trinidad presents to radiation oncology today for education regarding treatment to the Breast.      PLAN:   Expected and potential side effects were discussed in detail, along with written handouts.  Skin care and moisturization instructions were discussed in detail.   Patient was not agreeable to physical therapy referral. Explained referral could be placed at any time if patient changes their mind.   Patient was informed of dietician that is available weekly and as needed.  Educated on weekly on treatment visit to meet with Physician to monitor side effects and treatment course.  Informed patient that Physician is available at any time to discuss radiation side effects/concerns through out treatment course.  Trinidad had the opportunity to ask questions, and indicated that all questions were satisfactorily addressed.

## 2025-04-15 ENCOUNTER — HOSPITAL ENCOUNTER (OUTPATIENT)
Dept: RADIATION ONCOLOGY | Age: 75
Discharge: HOME | End: 2025-04-15
Payer: COMMERCIAL

## 2025-04-15 VITALS
SYSTOLIC BLOOD PRESSURE: 138 MMHG | OXYGEN SATURATION: 96 % | DIASTOLIC BLOOD PRESSURE: 63 MMHG | BODY MASS INDEX: 30.34 KG/M2 | TEMPERATURE: 98.2 F | WEIGHT: 182.32 LBS | RESPIRATION RATE: 18 BRPM | HEART RATE: 73 BPM

## 2025-04-15 PROCEDURE — 77412 RADIATION TX DELIVERY LVL 3: CPT | Performed by: RADIOLOGY

## 2025-04-15 PROCEDURE — G6002 STEREOSCOPIC X-RAY GUIDANCE: HCPCS | Performed by: RADIOLOGY

## 2025-04-15 PROCEDURE — 77387 GUIDANCE FOR RADJ TX DLVR: CPT | Performed by: RADIOLOGY

## 2025-04-15 NOTE — PROGRESS NOTES
[facility]          [address], [address]        [place] [id number]        O: [phone]        F: [phone]       Kreeda Games            Dr. Alvaro Wild MD MS          Dr. Vane Carlson MD PhD    ON TREATMENT VISIT (OTV) NOTE     Date of Service: 4/15/2025  Patient ID: Trinidad Smart   : 1950  MRN: [id number]   Acct Number: [id number]     RADIATION ONCOLOGY ATTENDING:  Alvaro Wild MD MS    DIAGNOSIS:   Cancer Staging   Malignant neoplasm of upper-outer quadrant of right breast in female, estrogen receptor positive  Staging form: Breast, AJCC 8th Edition  - Clinical stage from 2025: Stage IA (cT1, cN0, cM0, G1, ER+, IN+, HER2-) - Signed by Latricia Jackson MD on 2025  - Pathologic stage from 2025: Stage IA (pT1, pN0, cM0, G1, ER+, IN+, HER2-) - Signed by Latricia Jackson MD on 2025      Treatment Area: Breast     Current Total Dose(cGy): 534  Current Fraction: 2/15  Final/Cumulative Rx. Dose (cGy): 4005    Patient was seen today for weekly visit.     Wt Readings from Last 3 Encounters:   04/15/25 82.7 kg (182 lb 5.1 oz)   25 81.6 kg (180 lb)   25 81.6 kg (180 lb)       /63   Pulse 73   Temp 98.2 °F (36.8 °C) (Infrared)   Resp 18   Wt 82.7 kg (182 lb 5.1 oz)   SpO2 96%   BMI 30.34 kg/m²     Lab Results   Component Value Date    WBC 7.8 2025    HGB 13.8 2025    HCT 43.1 2025     2025       Comfort Alteration  Fatigue:None, able to perform daily activities    Pain Location: Denies  Pain Intensity (Current): 0 No Pain  Pain Treatment: N/A  Pain Relief: n/a  Hot Flashes/Flushes: None    Emotional Alteration:   Coping: effective    Nutritional Alteration  Anorexia: none   Nausea: No nausea noted  Vomiting: No vomiting   Dyspepsia/Heartburn: None    Skin Alteration   Skin reaction: Faint or dull erythema; follicular reaction, present prior to

## 2025-04-16 ENCOUNTER — HOSPITAL ENCOUNTER (OUTPATIENT)
Dept: RADIATION ONCOLOGY | Age: 75
Discharge: HOME | End: 2025-04-16
Payer: COMMERCIAL

## 2025-04-16 PROCEDURE — G6002 STEREOSCOPIC X-RAY GUIDANCE: HCPCS | Performed by: RADIOLOGY

## 2025-04-16 PROCEDURE — 77412 RADIATION TX DELIVERY LVL 3: CPT | Performed by: RADIOLOGY

## 2025-04-16 PROCEDURE — 77387 GUIDANCE FOR RADJ TX DLVR: CPT | Performed by: RADIOLOGY

## 2025-04-17 ENCOUNTER — HOSPITAL ENCOUNTER (OUTPATIENT)
Dept: RADIATION ONCOLOGY | Age: 75
Discharge: HOME | End: 2025-04-17
Payer: COMMERCIAL

## 2025-04-17 PROCEDURE — 77387 GUIDANCE FOR RADJ TX DLVR: CPT | Performed by: RADIOLOGY

## 2025-04-17 PROCEDURE — 77412 RADIATION TX DELIVERY LVL 3: CPT | Performed by: RADIOLOGY

## 2025-04-17 PROCEDURE — G6002 STEREOSCOPIC X-RAY GUIDANCE: HCPCS | Performed by: RADIOLOGY

## 2025-04-18 ENCOUNTER — HOSPITAL ENCOUNTER (OUTPATIENT)
Dept: RADIATION ONCOLOGY | Age: 75
Discharge: HOME | End: 2025-04-18
Payer: COMMERCIAL

## 2025-04-18 PROCEDURE — 77387 GUIDANCE FOR RADJ TX DLVR: CPT | Performed by: RADIOLOGY

## 2025-04-18 PROCEDURE — 77412 RADIATION TX DELIVERY LVL 3: CPT | Performed by: RADIOLOGY

## 2025-04-18 PROCEDURE — G6002 STEREOSCOPIC X-RAY GUIDANCE: HCPCS | Performed by: RADIOLOGY

## 2025-04-21 ENCOUNTER — HOSPITAL ENCOUNTER (OUTPATIENT)
Dept: RADIATION ONCOLOGY | Age: 75
Discharge: HOME | End: 2025-04-21
Payer: COMMERCIAL

## 2025-04-21 PROCEDURE — 77336 RADIATION PHYSICS CONSULT: CPT | Performed by: RADIOLOGY

## 2025-04-21 PROCEDURE — 77387 GUIDANCE FOR RADJ TX DLVR: CPT | Performed by: RADIOLOGY

## 2025-04-21 PROCEDURE — G6002 STEREOSCOPIC X-RAY GUIDANCE: HCPCS | Performed by: RADIOLOGY

## 2025-04-21 PROCEDURE — 77412 RADIATION TX DELIVERY LVL 3: CPT | Performed by: RADIOLOGY

## 2025-04-21 NOTE — DISCHARGE INSTRUCTIONS
PATIENT DISCHARGE INSTRUCTIONS    Remember that side effects present at the end of your treatments will improve within a few weeks after the last treatment.  Eat well balanced meals even though your treatments are finished.  This will help speed the healing process. Continue any special diets prescribed to control side effects until these side effects have been resolved.  Get plenty of rest.  If you have experienced fatigue and/or weakness, this may continue for several weeks after your last treatment.  Continue with your daily activities according to the way you feel.  Continue to be gentle with your skin.  Follow your present skin care instructions until your follow-up visit.  IF YOU DEVELOP ANY CHANGES IN YOUR SKIN IN THE AREA TREATED WITH RADIATION, PLEASE CALL THE RADIATION ONCOLOGY NURSE -[phone].  Protect your skin from any injury and avoid direct sun exposure in the treatment area.  The skin in the treated area may always be more sensitive than the rest of your skin.  Always use SPF 30 or higher sun block if you will be in the sun and cannot avoid exposure.  Please contact your referring physician for a follow-up appointment in addition to your Radiation Oncology appointment.  Presence of pain:   Medication Taper: No    See Instructions Dated: N/A  Follow up orders: Will be discussed at Follow-Up

## 2025-04-22 ENCOUNTER — HOSPITAL ENCOUNTER (OUTPATIENT)
Dept: RADIATION ONCOLOGY | Age: 75
Discharge: HOME | End: 2025-04-22
Payer: COMMERCIAL

## 2025-04-22 VITALS
RESPIRATION RATE: 16 BRPM | OXYGEN SATURATION: 98 % | HEART RATE: 62 BPM | SYSTOLIC BLOOD PRESSURE: 149 MMHG | DIASTOLIC BLOOD PRESSURE: 68 MMHG | TEMPERATURE: 98 F | BODY MASS INDEX: 30.34 KG/M2 | WEIGHT: 182.32 LBS

## 2025-04-22 PROCEDURE — 77387 GUIDANCE FOR RADJ TX DLVR: CPT | Performed by: RADIOLOGY

## 2025-04-22 PROCEDURE — G6002 STEREOSCOPIC X-RAY GUIDANCE: HCPCS | Performed by: RADIOLOGY

## 2025-04-22 PROCEDURE — 77412 RADIATION TX DELIVERY LVL 3: CPT | Performed by: RADIOLOGY

## 2025-04-22 NOTE — PROGRESS NOTES
[facility]          [address], [address]        [place] [id number]        O: [phone]        F: [phone]       [facility]            Dr. Alvaro Wild MD MS          Dr. Vane Carlson MD PhD    ON TREATMENT VISIT (OTV) NOTE     Date of Service: 2025  Patient ID: Trinidad Smart   : 1950  MRN: [id number]   Acct Number: [id number]     RADIATION ONCOLOGY ATTENDING:  Alvaro Wild MD MS    DIAGNOSIS:   Cancer Staging   Malignant neoplasm of upper-outer quadrant of right breast in female, estrogen receptor positive  Staging form: Breast, AJCC 8th Edition  - Clinical stage from 2025: Stage IA (cT1, cN0, cM0, G1, ER+, UT+, HER2-) - Signed by Latricia Jackson MD on 2025  - Pathologic stage from 2025: Stage IA (pT1, pN0, cM0, G1, ER+, UT+, HER2-) - Signed by Latricia Jackson MD on 2025      Treatment Area: Breast     Current Total Dose(cGy): 1869  Current Fraction: 7/15  Final/Cumulative Rx. Dose (cGy): 4005    Patient was seen today for weekly visit.     Wt Readings from Last 3 Encounters:   25 82.7 kg (182 lb 5.1 oz)   04/15/25 82.7 kg (182 lb 5.1 oz)   25 81.6 kg (180 lb)       BP (!) 149/68   Pulse 62   Temp 98 °F (36.7 °C) (Infrared)   Resp 16   Wt 82.7 kg (182 lb 5.1 oz)   SpO2 98%   BMI 30.34 kg/m²     Lab Results   Component Value Date    WBC 7.8 2025    HGB 13.8 2025    HCT 43.1 2025     2025       Comfort Alteration  Fatigue:Able to perform daily activities with rest periods    Pain Location: Denies  Pain Intensity (Current): 0 No Pain  Pain Treatment: N/A  Pain Relief: n/a  Hot Flashes/Flushes: None    Emotional Alteration:   Coping: effective    Nutritional Alteration  Anorexia: none   Nausea: No nausea noted  Vomiting: No vomiting   Dyspepsia/Heartburn: None    Skin Alteration   Skin reaction: Faint or dull erythema; follicular reaction,

## 2025-04-23 ENCOUNTER — HOSPITAL ENCOUNTER (OUTPATIENT)
Dept: RADIATION ONCOLOGY | Age: 75
Discharge: HOME | End: 2025-04-23
Payer: COMMERCIAL

## 2025-04-23 PROCEDURE — 77412 RADIATION TX DELIVERY LVL 3: CPT | Performed by: RADIOLOGY

## 2025-04-23 PROCEDURE — 77387 GUIDANCE FOR RADJ TX DLVR: CPT | Performed by: RADIOLOGY

## 2025-04-23 PROCEDURE — G6002 STEREOSCOPIC X-RAY GUIDANCE: HCPCS | Performed by: RADIOLOGY

## 2025-04-24 ENCOUNTER — HOSPITAL ENCOUNTER (OUTPATIENT)
Dept: RADIATION ONCOLOGY | Age: 75
Discharge: HOME | End: 2025-04-24
Payer: COMMERCIAL

## 2025-04-24 DIAGNOSIS — I10 PRIMARY HYPERTENSION: ICD-10-CM

## 2025-04-24 PROCEDURE — 77412 RADIATION TX DELIVERY LVL 3: CPT | Performed by: RADIOLOGY

## 2025-04-24 PROCEDURE — G6002 STEREOSCOPIC X-RAY GUIDANCE: HCPCS | Performed by: RADIOLOGY

## 2025-04-24 PROCEDURE — 77387 GUIDANCE FOR RADJ TX DLVR: CPT | Performed by: RADIOLOGY

## 2025-04-24 RX ORDER — LOSARTAN POTASSIUM 100 MG/1
100 TABLET ORAL DAILY
Qty: 90 TABLET | Refills: 0 | Status: SHIPPED | OUTPATIENT
Start: 2025-04-24

## 2025-04-25 ENCOUNTER — HOSPITAL ENCOUNTER (OUTPATIENT)
Dept: RADIATION ONCOLOGY | Age: 75
Discharge: HOME | End: 2025-04-25
Payer: COMMERCIAL

## 2025-04-25 PROCEDURE — 77387 GUIDANCE FOR RADJ TX DLVR: CPT | Performed by: RADIOLOGY

## 2025-04-25 PROCEDURE — G6002 STEREOSCOPIC X-RAY GUIDANCE: HCPCS | Performed by: RADIOLOGY

## 2025-04-25 PROCEDURE — 77412 RADIATION TX DELIVERY LVL 3: CPT | Performed by: RADIOLOGY

## 2025-04-28 ENCOUNTER — HOSPITAL ENCOUNTER (OUTPATIENT)
Dept: RADIATION ONCOLOGY | Age: 75
Discharge: HOME | End: 2025-04-28
Payer: COMMERCIAL

## 2025-04-28 PROCEDURE — 77336 RADIATION PHYSICS CONSULT: CPT | Performed by: RADIOLOGY

## 2025-04-28 PROCEDURE — 77412 RADIATION TX DELIVERY LVL 3: CPT | Performed by: RADIOLOGY

## 2025-04-28 PROCEDURE — 77387 GUIDANCE FOR RADJ TX DLVR: CPT | Performed by: RADIOLOGY

## 2025-04-28 PROCEDURE — G6002 STEREOSCOPIC X-RAY GUIDANCE: HCPCS | Performed by: RADIOLOGY

## 2025-04-29 ENCOUNTER — HOSPITAL ENCOUNTER (OUTPATIENT)
Dept: RADIATION ONCOLOGY | Age: 75
Discharge: HOME | End: 2025-04-29
Payer: COMMERCIAL

## 2025-04-29 VITALS
TEMPERATURE: 98.2 F | BODY MASS INDEX: 30.49 KG/M2 | SYSTOLIC BLOOD PRESSURE: 134 MMHG | DIASTOLIC BLOOD PRESSURE: 64 MMHG | OXYGEN SATURATION: 97 % | HEART RATE: 74 BPM | WEIGHT: 183.2 LBS | RESPIRATION RATE: 16 BRPM

## 2025-04-29 PROCEDURE — 77412 RADIATION TX DELIVERY LVL 3: CPT | Performed by: RADIOLOGY

## 2025-04-29 PROCEDURE — 77387 GUIDANCE FOR RADJ TX DLVR: CPT | Performed by: RADIOLOGY

## 2025-04-29 PROCEDURE — G6002 STEREOSCOPIC X-RAY GUIDANCE: HCPCS | Performed by: RADIOLOGY

## 2025-04-29 NOTE — PROGRESS NOTES
[facility]          [address], [address]        [place] [address]        O: [phone]        F: [phone]       [facility]            Dr. Alvaro Wild MD MS          Dr. Vane Carlson MD PhD    ON TREATMENT VISIT (OTV) NOTE     Date of Service: 2025  Patient ID: Trinidad Smart   : 1950  MRN: [id number]   Acct Number: [id number]     RADIATION ONCOLOGY ATTENDING:  Alvaro Wild MD MS    DIAGNOSIS:   Cancer Staging   Malignant neoplasm of upper-outer quadrant of right breast in female, estrogen receptor positive  Staging form: Breast, AJCC 8th Edition  - Clinical stage from 2025: Stage IA (cT1, cN0, cM0, G1, ER+, [place]+, HER2-) - Signed by Latricia Jackson MD on 2025  - Pathologic stage from 2025: Stage IA (pT1, pN0, cM0, G1, ER+, [place]+, HER2-) - Signed by Latricia Jackson MD on 2025      Treatment Area: Breast     Current Total Dose(cGy): 3204  Current Fraction: 12/15  Final/Cumulative Rx. Dose (cGy): 4005    Patient was seen today for weekly visit.     Wt Readings from Last 3 Encounters:   25 83.1 kg (183 lb 3.2 oz)   25 82.7 kg (182 lb 5.1 oz)   04/15/25 82.7 kg (182 lb 5.1 oz)       /64   Pulse 74   Temp 98.2 °F (36.8 °C) (Infrared)   Resp 16   Wt 83.1 kg (183 lb 3.2 oz)   SpO2 97%   BMI 30.49 kg/m²     Lab Results   Component Value Date    WBC 7.8 2025    HGB 13.8 2025    HCT 43.1 2025     2025       Comfort Alteration  Fatigue:Able to perform daily activities with rest periods    Pain Location: Denies  Pain Intensity (Current): 0 No Pain  Pain Treatment: N/A  Pain Relief: n/a  Hot Flashes/Flushes: None    Emotional Alteration:   Coping: effective    Nutritional Alteration  Anorexia: none   Nausea: No nausea noted  Vomiting: No vomiting   Dyspepsia/Heartburn: None    Skin Alteration   Skin reaction: Faint or dull erythema; follicular

## 2025-04-30 ENCOUNTER — HOSPITAL ENCOUNTER (OUTPATIENT)
Dept: RADIATION ONCOLOGY | Age: 75
Discharge: HOME | End: 2025-04-30
Payer: COMMERCIAL

## 2025-04-30 PROCEDURE — 77387 GUIDANCE FOR RADJ TX DLVR: CPT | Performed by: RADIOLOGY

## 2025-04-30 PROCEDURE — 77412 RADIATION TX DELIVERY LVL 3: CPT | Performed by: RADIOLOGY

## 2025-04-30 PROCEDURE — G6002 STEREOSCOPIC X-RAY GUIDANCE: HCPCS | Performed by: RADIOLOGY

## 2025-05-01 ENCOUNTER — HOSPITAL ENCOUNTER (OUTPATIENT)
Dept: RADIATION ONCOLOGY | Age: 75
Discharge: HOME | End: 2025-05-01
Payer: COMMERCIAL

## 2025-05-01 PROCEDURE — 77412 RADIATION TX DELIVERY LVL 3: CPT | Performed by: RADIOLOGY

## 2025-05-01 PROCEDURE — 77387 GUIDANCE FOR RADJ TX DLVR: CPT | Performed by: RADIOLOGY

## 2025-05-01 PROCEDURE — G6002 STEREOSCOPIC X-RAY GUIDANCE: HCPCS | Performed by: RADIOLOGY

## 2025-05-02 ENCOUNTER — HOSPITAL ENCOUNTER (OUTPATIENT)
Dept: RADIATION ONCOLOGY | Age: 75
Discharge: HOME | End: 2025-05-02
Payer: COMMERCIAL

## 2025-05-02 PROCEDURE — 77387 GUIDANCE FOR RADJ TX DLVR: CPT | Performed by: RADIOLOGY

## 2025-05-02 PROCEDURE — G6002 STEREOSCOPIC X-RAY GUIDANCE: HCPCS | Performed by: RADIOLOGY

## 2025-05-02 PROCEDURE — 77412 RADIATION TX DELIVERY LVL 3: CPT | Performed by: RADIOLOGY

## 2025-05-08 ENCOUNTER — OFFICE VISIT (OUTPATIENT)
Dept: ONCOLOGY | Age: 75
End: 2025-05-08
Payer: COMMERCIAL

## 2025-05-08 ENCOUNTER — HOSPITAL ENCOUNTER (OUTPATIENT)
Dept: INFUSION THERAPY | Age: 75
Discharge: HOME | End: 2025-05-08
Payer: COMMERCIAL

## 2025-05-08 VITALS
HEART RATE: 83 BPM | DIASTOLIC BLOOD PRESSURE: 60 MMHG | TEMPERATURE: 98.3 F | RESPIRATION RATE: 16 BRPM | SYSTOLIC BLOOD PRESSURE: 128 MMHG

## 2025-05-08 VITALS
WEIGHT: 180 LBS | SYSTOLIC BLOOD PRESSURE: 128 MMHG | HEART RATE: 83 BPM | TEMPERATURE: 98.3 F | HEIGHT: 65 IN | OXYGEN SATURATION: 95 % | BODY MASS INDEX: 29.99 KG/M2 | DIASTOLIC BLOOD PRESSURE: 60 MMHG | RESPIRATION RATE: 16 BRPM

## 2025-05-08 DIAGNOSIS — M85.80 OSTEOPENIA AFTER MENOPAUSE: ICD-10-CM

## 2025-05-08 DIAGNOSIS — Z78.0 OSTEOPENIA AFTER MENOPAUSE: ICD-10-CM

## 2025-05-08 DIAGNOSIS — E21.3 HYPERPARATHYROIDISM: ICD-10-CM

## 2025-05-08 DIAGNOSIS — Z17.0 MALIGNANT NEOPLASM OF UPPER-OUTER QUADRANT OF RIGHT BREAST IN FEMALE, ESTROGEN RECEPTOR POSITIVE (HCC): Primary | ICD-10-CM

## 2025-05-08 DIAGNOSIS — Z79.811 USE OF ANASTROZOLE: ICD-10-CM

## 2025-05-08 DIAGNOSIS — C50.411 MALIGNANT NEOPLASM OF UPPER-OUTER QUADRANT OF RIGHT BREAST IN FEMALE, ESTROGEN RECEPTOR POSITIVE (HCC): Primary | ICD-10-CM

## 2025-05-08 PROCEDURE — 99214 OFFICE O/P EST MOD 30 MIN: CPT | Performed by: INTERNAL MEDICINE

## 2025-05-08 PROCEDURE — 3078F DIAST BP <80 MM HG: CPT | Performed by: INTERNAL MEDICINE

## 2025-05-08 PROCEDURE — 99211 OFF/OP EST MAY X REQ PHY/QHP: CPT

## 2025-05-08 PROCEDURE — 1123F ACP DISCUSS/DSCN MKR DOCD: CPT | Performed by: INTERNAL MEDICINE

## 2025-05-08 PROCEDURE — 3074F SYST BP LT 130 MM HG: CPT | Performed by: INTERNAL MEDICINE

## 2025-05-08 PROCEDURE — G2211 COMPLEX E/M VISIT ADD ON: HCPCS | Performed by: INTERNAL MEDICINE

## 2025-05-08 RX ORDER — ANASTROZOLE 1 MG/1
1 TABLET ORAL DAILY
Qty: 90 TABLET | Refills: 3 | Status: SHIPPED | OUTPATIENT
Start: 2025-05-08

## 2025-05-08 NOTE — PROGRESS NOTES
used hearing aids    Prostate Cancer Father         treated by surgery    Arthritis Sister         back and knees    Breast Cancer Sister         stage 1    Stroke Brother     Cancer Brother         Prostate    Prostate Cancer Brother     Cancer Brother         Prostate    Prostate Cancer Brother     Cancer Brother         appendix    Cancer Brother 44         from testicular cancer    Stroke Brother         had stroke when he was 44    Cancer Paternal Grandfather         Allergies:  Allergies   Allergen Reactions    Sulfa Antibiotics         Adult Illness:  Patient Active Problem List   Diagnosis    Allergies    Cancer (HCC)    Hyperlipidemia    Hyperparathyroidism    Hypertension    Skin cancer, basal cell    Malignant neoplasm of upper-outer quadrant of right breast in female, estrogen receptor positive (HCC)    Malignant neoplasm of upper-outer quadrant of right female breast (HCC)        Surgery:  Past Surgical History:   Procedure Laterality Date    BREAST LUMPECTOMY Right 2025    Right breast lumpectomy with preop needle localization performed by Latricia Jackson MD at [facility] [place] [facility]    BREAST SURGERY Left     COLONOSCOPY  10/25/2024    Dr. Trent Franz    CT NEEDLE BIOPSY LIVER PERCUTANEOUS  10/17/2024    CT NEEDLE BIOPSY LIVER PERCUTANEOUS 10/17/2024 [facility] CT SCAN    DENTAL SURGERY      molar removed 2025    HYSTERECTOMY, TOTAL ABDOMINAL (CERVIX REMOVED)  2008    HYSTERECTOMY, VAGINAL      LEEP      MRI BIOPSY BREAST W LOC DEVICE LEFT 1ST LESION Left 2025    MRI BIOPSY BREAST W LOC DEVICE LEFT 1ST LESION 2025 [facility] MRI    OVARY REMOVAL      US PLACE BREAST LOC DEVICE 1ST LESION RIGHT Right 2025    US PLACE BREAST LOC DEVICE 1ST LESION RIGHT 2025 [facility]        Medications:  Current Outpatient Medications   Medication Sig Dispense Refill    losartan (COZAAR) 100 MG tablet Take 1 tablet by mouth once daily 90 tablet 0    betamethasone valerate

## 2025-05-08 NOTE — PATIENT INSTRUCTIONS
Return in about 3 months (around 8/8/2025).   Data Unavailable   No orders of the defined types were placed in this encounter.

## 2025-05-13 SDOH — ECONOMIC STABILITY: FOOD INSECURITY: WITHIN THE PAST 12 MONTHS, YOU WORRIED THAT YOUR FOOD WOULD RUN OUT BEFORE YOU GOT MONEY TO BUY MORE.: NEVER TRUE

## 2025-05-13 SDOH — ECONOMIC STABILITY: INCOME INSECURITY: IN THE LAST 12 MONTHS, WAS THERE A TIME WHEN YOU WERE NOT ABLE TO PAY THE MORTGAGE OR RENT ON TIME?: NO

## 2025-05-13 SDOH — ECONOMIC STABILITY: FOOD INSECURITY: WITHIN THE PAST 12 MONTHS, THE FOOD YOU BOUGHT JUST DIDN'T LAST AND YOU DIDN'T HAVE MONEY TO GET MORE.: NEVER TRUE

## 2025-05-13 SDOH — ECONOMIC STABILITY: TRANSPORTATION INSECURITY
IN THE PAST 12 MONTHS, HAS THE LACK OF TRANSPORTATION KEPT YOU FROM MEDICAL APPOINTMENTS OR FROM GETTING MEDICATIONS?: NO

## 2025-05-13 ASSESSMENT — PATIENT HEALTH QUESTIONNAIRE - PHQ9
SUM OF ALL RESPONSES TO PHQ QUESTIONS 1-9: 0
2. FEELING DOWN, DEPRESSED OR HOPELESS: NOT AT ALL
2. FEELING DOWN, DEPRESSED OR HOPELESS: NOT AT ALL
1. LITTLE INTEREST OR PLEASURE IN DOING THINGS: NOT AT ALL
1. LITTLE INTEREST OR PLEASURE IN DOING THINGS: NOT AT ALL
SUM OF ALL RESPONSES TO PHQ QUESTIONS 1-9: 0
SUM OF ALL RESPONSES TO PHQ QUESTIONS 1-9: 0
SUM OF ALL RESPONSES TO PHQ9 QUESTIONS 1 & 2: 0
SUM OF ALL RESPONSES TO PHQ QUESTIONS 1-9: 0

## 2025-05-16 ENCOUNTER — OFFICE VISIT (OUTPATIENT)
Dept: FAMILY MEDICINE CLINIC | Age: 75
End: 2025-05-16
Payer: COMMERCIAL

## 2025-05-16 ENCOUNTER — TELEPHONE (OUTPATIENT)
Dept: FAMILY MEDICINE CLINIC | Age: 75
End: 2025-05-16

## 2025-05-16 VITALS
HEART RATE: 68 BPM | BODY MASS INDEX: 30.99 KG/M2 | HEIGHT: 65 IN | RESPIRATION RATE: 18 BRPM | OXYGEN SATURATION: 99 % | DIASTOLIC BLOOD PRESSURE: 76 MMHG | WEIGHT: 186 LBS | SYSTOLIC BLOOD PRESSURE: 128 MMHG

## 2025-05-16 DIAGNOSIS — I10 PRIMARY HYPERTENSION: Primary | ICD-10-CM

## 2025-05-16 DIAGNOSIS — M85.80 OSTEOPENIA, UNSPECIFIED LOCATION: ICD-10-CM

## 2025-05-16 DIAGNOSIS — R16.0 LIVER MASS: ICD-10-CM

## 2025-05-16 DIAGNOSIS — E78.5 HYPERLIPIDEMIA, UNSPECIFIED HYPERLIPIDEMIA TYPE: ICD-10-CM

## 2025-05-16 DIAGNOSIS — R16.0 LIVER MASS: Primary | ICD-10-CM

## 2025-05-16 DIAGNOSIS — E21.3 HYPERPARATHYROIDISM: ICD-10-CM

## 2025-05-16 PROCEDURE — 1123F ACP DISCUSS/DSCN MKR DOCD: CPT | Performed by: FAMILY MEDICINE

## 2025-05-16 PROCEDURE — 99214 OFFICE O/P EST MOD 30 MIN: CPT | Performed by: FAMILY MEDICINE

## 2025-05-16 PROCEDURE — 3078F DIAST BP <80 MM HG: CPT | Performed by: FAMILY MEDICINE

## 2025-05-16 PROCEDURE — 3074F SYST BP LT 130 MM HG: CPT | Performed by: FAMILY MEDICINE

## 2025-05-16 RX ORDER — AMLODIPINE BESYLATE 10 MG/1
10 TABLET ORAL DAILY
Qty: 90 TABLET | Refills: 3 | Status: SHIPPED | OUTPATIENT
Start: 2025-05-16

## 2025-05-16 RX ORDER — SIMVASTATIN 20 MG
20 TABLET ORAL NIGHTLY
Qty: 90 TABLET | Refills: 3 | Status: SHIPPED | OUTPATIENT
Start: 2025-05-16

## 2025-05-16 RX ORDER — METOPROLOL SUCCINATE 200 MG/1
200 TABLET, EXTENDED RELEASE ORAL DAILY
Qty: 90 TABLET | Refills: 3 | Status: SHIPPED | OUTPATIENT
Start: 2025-05-16

## 2025-05-16 RX ORDER — IBUPROFEN 200 MG
200 TABLET ORAL PRN
COMMUNITY

## 2025-05-16 RX ORDER — LOSARTAN POTASSIUM 100 MG/1
100 TABLET ORAL DAILY
Qty: 90 TABLET | Refills: 3 | Status: SHIPPED | OUTPATIENT
Start: 2025-05-16

## 2025-05-16 ASSESSMENT — ENCOUNTER SYMPTOMS
CONSTIPATION: 0
COUGH: 0
VOMITING: 0
TROUBLE SWALLOWING: 0
SORE THROAT: 0
ABDOMINAL PAIN: 0
DIARRHEA: 0
SHORTNESS OF BREATH: 0

## 2025-05-16 NOTE — TELEPHONE ENCOUNTER
----- Message from Dr. Katelyn Leopold, MD sent at 5/16/2025 11:46 AM EDT -----  Regarding: Follow up regarding liver imaging  Please let Trinidad know that Dr. Carrion recommended that we repeat her MRI of the liver this month if possible.   I have ordered, she can schedule through Central Scheduling at her convenience  Thanks

## 2025-05-16 NOTE — PROGRESS NOTES
[facility] [place] [facility]  [address]  [place] [address]  Dept: [phone]     SUBJECTIVE     Trinidad Smart is a 75 y.o.female    History of Present Illness  The patient presents for evaluation of breast cancer, hyperthyroidism, osteopenia, hepatic lesion, and knee pain.    She recently initiated anastrozole therapy under the supervision of her oncologist, Dr. Carrion, with a follow-up appointment scheduled for 08/2025. She has completed her radiation treatment and is due for a follow-up with the radiation physician in 07/2025. A mammogram or MRI of her breast is planned for 12/2025.    She is under the care of Dr. Velazquez for hyperparathyroidism, with a consultation scheduled for 06/05/2025    She has declined Fosamax for newly diagnosed osteopenia (1/2025) due to existing dental issues and concerns about potential complications during tooth extraction.    She reports no abdominal pain and maintains regular bowel movements, aided by a daily intake of five prunes.    Her physical activity has been limited due to knee discomfort.    She has been experiencing severe allergies and is currently using Flonase and Mucinex for symptom management.    She expresses a desire to have her blood work repeated in the fall. She reports satisfactory sleep quality, with the exception of the previous night due to storm-related anxiety. She does not require any medication refills at this time.    Patient Active Problem List   Diagnosis    Allergies    Cancer (HCC)    Hyperlipidemia    Hyperparathyroidism    Hypertension    Skin cancer, basal cell    Malignant neoplasm of upper-outer quadrant of right breast in female, estrogen receptor positive (HCC)    Malignant neoplasm of upper-outer quadrant of right female breast (HCC)       Current Outpatient Medications   Medication Sig Dispense Refill    ibuprofen (ADVIL;MOTRIN) 200 MG tablet Take 1 tablet by mouth as needed for Pain      losartan

## 2025-05-29 ENCOUNTER — TELEPHONE (OUTPATIENT)
Dept: FAMILY MEDICINE CLINIC | Age: 75
End: 2025-05-29

## 2025-05-29 NOTE — TELEPHONE ENCOUNTER
Pt was referred to Dr Evangelista for cataract by her eye doctor and was questioning if that visit would be covered because it was not from a provider that is in network (meaning her eye doctor) I explained that as long as Dr Evangelista is in her network there should be no problem.  She proceeded to say that if she gets a bill then she is going to be upset with me. I told her the best thing to do would be to contact her insurance and if she needs the referral to come from Dr Leopold to call back.

## 2025-06-05 ENCOUNTER — OFFICE VISIT (OUTPATIENT)
Age: 75
End: 2025-06-05
Payer: COMMERCIAL

## 2025-06-05 VITALS
SYSTOLIC BLOOD PRESSURE: 132 MMHG | HEIGHT: 65 IN | HEART RATE: 80 BPM | BODY MASS INDEX: 30.49 KG/M2 | DIASTOLIC BLOOD PRESSURE: 70 MMHG | WEIGHT: 183 LBS

## 2025-06-05 DIAGNOSIS — M85.9 LOW BONE DENSITY: ICD-10-CM

## 2025-06-05 DIAGNOSIS — E21.3 HYPERPARATHYROIDISM: Primary | ICD-10-CM

## 2025-06-05 DIAGNOSIS — E83.52 HYPERCALCEMIA: ICD-10-CM

## 2025-06-05 PROCEDURE — 3075F SYST BP GE 130 - 139MM HG: CPT | Performed by: INTERNAL MEDICINE

## 2025-06-05 PROCEDURE — 3078F DIAST BP <80 MM HG: CPT | Performed by: INTERNAL MEDICINE

## 2025-06-05 PROCEDURE — 1123F ACP DISCUSS/DSCN MKR DOCD: CPT | Performed by: INTERNAL MEDICINE

## 2025-06-05 PROCEDURE — 99204 OFFICE O/P NEW MOD 45 MIN: CPT | Performed by: INTERNAL MEDICINE

## 2025-06-05 NOTE — PROGRESS NOTES
Out    Meningococcal B vaccine  Aged Out    Breast cancer screen  Discontinued    Colorectal Cancer Screen  Discontinued       Labs  Lab Results   Component Value Date    LABA1C 6.0 09/26/2024     Lab Results   Component Value Date     09/26/2024     Lab Results   Component Value Date    TSH 4.110 08/10/2023     Lab Results   Component Value Date    CHOL 176 09/26/2024     Lab Results   Component Value Date    TRIG 123 09/26/2024     Lab Results   Component Value Date    HDL 51 09/26/2024    HDL 54 08/10/2023    HDL 50 10/12/2022     No components found for: \"LDLCALC\", \"LDLCHOLESTEROL\"  No results found for: \"VLDL\"  No results found for: \"CHOLHDLRATIO\"      Review of Systems   Constitutional: negative for chills, fatigue and fevers  Eyes: cataracts.  Respiratory: negative for cough, shortness of breath and wheezing  Cardiovascular: negative for chest pain, irregular heart beat and palpitations     The remainder of systems were reviewed and negative.     Objective:    /70 (BP Site: Right Upper Arm, Patient Position: Sitting, BP Cuff Size: Large Adult)   Pulse 80   Ht 1.651 m (5' 5\")   Wt 83 kg (183 lb)   BMI 30.45 kg/m²   Body surface area is 1.95 meters squared.    Physical Exam   Physical Exam       General: alert, appears stated age, cooperative and no distress  Eyes: negative findings: lids and lashes normal, conjunctivae and sclerae normal, corneas clear and pupils equal, round, reactive to light and accomodation  Neck:no adenopathy, no carotid bruit, no JVD, and supple, symmetrical, trachea midline  Thyroid: no palpable nodule  Lung:clear to auscultation bilaterally  Heart: regular rate and rhythm, S1, S2 normal, no murmur, click, rub or gallop and normal apical impulse  Abdomen:soft, non-tender; bowel sounds normal; no masses,  no organomegaly  Extremities:extremities normal, atraumatic, no cyanosis or edema and Homans sign is negative, no sign of DVT  Pulses:2+ and symmetric  Skin:warm and

## 2025-06-10 ENCOUNTER — HOSPITAL ENCOUNTER (OUTPATIENT)
Dept: RADIATION ONCOLOGY | Age: 75
Discharge: HOME | End: 2025-06-10
Payer: COMMERCIAL

## 2025-06-10 VITALS
RESPIRATION RATE: 18 BRPM | HEART RATE: 78 BPM | BODY MASS INDEX: 30.72 KG/M2 | SYSTOLIC BLOOD PRESSURE: 137 MMHG | OXYGEN SATURATION: 94 % | WEIGHT: 184.6 LBS | DIASTOLIC BLOOD PRESSURE: 62 MMHG | TEMPERATURE: 98.3 F

## 2025-06-10 PROCEDURE — 99212 OFFICE O/P EST SF 10 MIN: CPT

## 2025-06-10 ASSESSMENT — ENCOUNTER SYMPTOMS
BACK PAIN: 0
COUGH: 1
DIARRHEA: 0
RECTAL PAIN: 0
NAUSEA: 0
VOMITING: 0
TROUBLE SWALLOWING: 0
CONSTIPATION: 0
ABDOMINAL PAIN: 0
BLOOD IN STOOL: 0
SHORTNESS OF BREATH: 0

## 2025-06-10 NOTE — PROGRESS NOTES
portion of the note and I agree with the information and assessment as written. A complete review of systems was performed and found to be negative except as presented above.    PHYSICAL EXAMINATION:  VITAL SIGNS: /62   Pulse 78   Temp 98.3 °F (36.8 °C) (Infrared)   Resp 18   Wt 83.7 kg (184 lb 9.6 oz)   SpO2 94%   BMI 30.72 kg/m²     ECO - Symptomatic but completely ambulatory (Restricted in physically strenuous activity but ambulatory and able to carry out work of a light or sedentary nature. For example, light housework, office work)    Physical Exam  Constitutional:       General: She is not in acute distress.     Appearance: Normal appearance.   HENT:      Head: Normocephalic and atraumatic.   Pulmonary:      Effort: Pulmonary effort is normal. No respiratory distress.   Chest:      Comments: Faint erythema of the right breast, with minimal dry desquamation of areola  Abdominal:      General: Abdomen is flat.   Neurological:      Mental Status: She is alert and oriented to person, place, and time.         ATTESTATION: 20 minutes were spent with the patient at today's visit reviewing pertinent information related to their oncologic diagnosis, including any recent labs, imaging, follow ups and plan of care going forward. >50% of time spent in counseling and coordinating care.    CC:Dr. Karin Carrion ([facility])  ACC:[facility]

## 2025-06-12 ENCOUNTER — HOSPITAL ENCOUNTER (OUTPATIENT)
Dept: MRI IMAGING | Age: 75
Discharge: HOME | End: 2025-06-12
Payer: COMMERCIAL

## 2025-06-12 DIAGNOSIS — R16.0 LIVER MASS: ICD-10-CM

## 2025-06-12 LAB
CREAT SERPL-MCNC: 0.7 MG/DL (ref 0.6–1.3)
GFR SERPL CREATININE-BSD FRML MDRD: 89 ML/MIN/1.73M2

## 2025-06-12 PROCEDURE — 6360000004 HC RX CONTRAST MEDICATION: Performed by: FAMILY MEDICINE

## 2025-06-12 PROCEDURE — 82565 ASSAY OF CREATININE: CPT

## 2025-06-12 PROCEDURE — A9579 GAD-BASE MR CONTRAST NOS,1ML: HCPCS | Performed by: FAMILY MEDICINE

## 2025-06-12 PROCEDURE — 74183 MRI ABD W/O CNTR FLWD CNTR: CPT

## 2025-06-12 RX ORDER — GADOTERIDOL 279.3 MG/ML
20 INJECTION INTRAVENOUS
Status: COMPLETED | OUTPATIENT
Start: 2025-06-12 | End: 2025-06-12

## 2025-06-12 RX ADMIN — GADOTERIDOL 20 ML: 279.3 INJECTION, SOLUTION INTRAVENOUS at 08:54

## 2025-06-13 ENCOUNTER — RESULTS FOLLOW-UP (OUTPATIENT)
Dept: FAMILY MEDICINE CLINIC | Age: 75
End: 2025-06-13

## 2025-06-15 ENCOUNTER — PATIENT MESSAGE (OUTPATIENT)
Dept: FAMILY MEDICINE CLINIC | Age: 75
End: 2025-06-15

## 2025-06-15 DIAGNOSIS — H35.3230 BILATERAL EXUDATIVE AGE-RELATED MACULAR DEGENERATION, UNSPECIFIED STAGE (HCC): Primary | ICD-10-CM

## 2025-06-16 PROBLEM — H35.3230 BILATERAL EXUDATIVE AGE-RELATED MACULAR DEGENERATION (HCC): Status: ACTIVE | Noted: 2025-06-16

## 2025-06-16 NOTE — TELEPHONE ENCOUNTER
Dear Trinidad :    Thank you for using [employer].      I am forwarding your message on to Dr. Leopold. Please allow her some time to review the message and act on it as necessary.  You should hear something within 24-48 business hours.      Thank you.

## 2025-06-19 ENCOUNTER — LAB (OUTPATIENT)
Dept: LAB | Age: 75
End: 2025-06-19

## 2025-06-19 DIAGNOSIS — E21.3 HYPERPARATHYROIDISM: ICD-10-CM

## 2025-06-19 LAB
25(OH)D3 SERPL-MCNC: 52 NG/ML (ref 30–100)
ALBUMIN SERPL BCG-MCNC: 4.3 G/DL (ref 3.4–4.9)
ALP SERPL-CCNC: 158 U/L (ref 38–126)
ALT SERPL W/O P-5'-P-CCNC: 16 U/L (ref 10–35)
ANION GAP SERPL CALC-SCNC: 10 MEQ/L (ref 8–16)
AST SERPL-CCNC: 22 U/L (ref 10–35)
BILIRUB SERPL-MCNC: 0.4 MG/DL (ref 0.3–1.2)
BUN SERPL-MCNC: 12 MG/DL (ref 8–23)
CALCIUM 24H UR-MRATE: 445 MG/24HR (ref 100–240)
CALCIUM SERPL-MCNC: 11 MG/DL (ref 8.8–10.2)
CALCIUM UR-MCNC: 13.1 MG/DL
CHLORIDE SERPL-SCNC: 99 MEQ/L (ref 98–111)
CO2 SERPL-SCNC: 26 MEQ/L (ref 22–29)
CREAT 24H UR-MRATE: 1.2 GM/24HR (ref 0.7–1.6)
CREAT SERPL-MCNC: 0.7 MG/DL (ref 0.5–0.9)
CREAT UR-MCNC: 35.7 MG/DL
GFR SERPL CREATININE-BSD FRML MDRD: 89 ML/MIN/1.73M2
GLUCOSE SERPL-MCNC: 113 MG/DL (ref 74–109)
HOURS COLLECTED: 24 HRS
MAGNESIUM SERPL-MCNC: 2.1 MG/DL (ref 1.6–2.6)
POTASSIUM SERPL-SCNC: 4.5 MEQ/L (ref 3.5–5.2)
PROT SERPL-MCNC: 7.2 G/DL (ref 6.4–8.3)
PTH-INTACT SERPL-MCNC: 48 PG/ML (ref 15–65)
SODIUM 24H UR-SRATE: 238 MEQ/24HR (ref 75–200)
SODIUM SERPL-SCNC: 135 MEQ/L (ref 135–145)
SODIUM UR-SCNC: 70 MEQ/L
URINE VOLUME MEASURE: 3400 ML
URINE VOLUME, 24 HOUR: 3400 ML
URINE VOLUME, 24 HOUR: 3400 ML

## 2025-06-21 LAB — CA-I SERPL ISE-SCNC: NORMAL MMOL/L

## 2025-06-22 ENCOUNTER — RESULTS FOLLOW-UP (OUTPATIENT)
Age: 75
End: 2025-06-22

## 2025-06-22 ENCOUNTER — CLINICAL DOCUMENTATION (OUTPATIENT)
Age: 75
End: 2025-06-22

## 2025-06-22 DIAGNOSIS — Z78.0 POST-MENOPAUSAL: ICD-10-CM

## 2025-06-22 DIAGNOSIS — E21.3 HYPERPARATHYROIDISM: Primary | ICD-10-CM

## 2025-07-10 ENCOUNTER — HOSPITAL ENCOUNTER (OUTPATIENT)
Dept: WOMENS IMAGING | Age: 75
Discharge: HOME | End: 2025-07-10
Attending: INTERNAL MEDICINE
Payer: COMMERCIAL

## 2025-07-10 ENCOUNTER — HOSPITAL ENCOUNTER (OUTPATIENT)
Dept: ULTRASOUND IMAGING | Age: 75
Discharge: HOME | End: 2025-07-10
Attending: INTERNAL MEDICINE
Payer: COMMERCIAL

## 2025-07-10 ENCOUNTER — HOSPITAL ENCOUNTER (OUTPATIENT)
Dept: WOMENS IMAGING | Age: 75
End: 2025-07-10
Attending: INTERNAL MEDICINE
Payer: COMMERCIAL

## 2025-07-10 DIAGNOSIS — E21.3 HYPERPARATHYROIDISM: ICD-10-CM

## 2025-07-10 DIAGNOSIS — Z78.0 POST-MENOPAUSAL: ICD-10-CM

## 2025-07-10 PROCEDURE — 76770 US EXAM ABDO BACK WALL COMP: CPT

## 2025-07-10 PROCEDURE — 77081 DXA BONE DENSITY APPENDICULR: CPT

## 2025-07-19 ENCOUNTER — RESULTS FOLLOW-UP (OUTPATIENT)
Age: 75
End: 2025-07-19

## 2025-07-19 NOTE — RESULT ENCOUNTER NOTE
Trinidad Smart  lab test(s) were abnormal.  Ultrasound shows no kidney stones but she has cysts on both sides.  Please discuss with PCP.  Please contact patient and document response.

## 2025-07-21 NOTE — TELEPHONE ENCOUNTER
----- Message from Dr. Nicholas Velazquez MD sent at 7/19/2025  5:51 PM EDT -----  Trinidad Smart  lab test(s) were abnormal.  Ultrasound shows no kidney stones but she has cysts on both sides.  Please discuss with PCP.  Please contact patient and document response.

## 2025-07-21 NOTE — TELEPHONE ENCOUNTER
Patient called back in and was advised of results. She verbalized understanding and has no further questions at this time.

## 2025-07-31 DIAGNOSIS — C50.411 MALIGNANT NEOPLASM OF UPPER-OUTER QUADRANT OF RIGHT BREAST IN FEMALE, ESTROGEN RECEPTOR POSITIVE (HCC): Primary | ICD-10-CM

## 2025-07-31 DIAGNOSIS — Z17.0 MALIGNANT NEOPLASM OF UPPER-OUTER QUADRANT OF RIGHT BREAST IN FEMALE, ESTROGEN RECEPTOR POSITIVE (HCC): Primary | ICD-10-CM

## 2025-08-11 ENCOUNTER — HOSPITAL ENCOUNTER (OUTPATIENT)
Dept: MRI IMAGING | Age: 75
Discharge: HOME | End: 2025-08-11
Attending: INTERNAL MEDICINE
Payer: COMMERCIAL

## 2025-08-11 DIAGNOSIS — C50.411 MALIGNANT NEOPLASM OF UPPER-OUTER QUADRANT OF RIGHT BREAST IN FEMALE, ESTROGEN RECEPTOR POSITIVE (HCC): ICD-10-CM

## 2025-08-11 DIAGNOSIS — Z17.0 MALIGNANT NEOPLASM OF UPPER-OUTER QUADRANT OF RIGHT BREAST IN FEMALE, ESTROGEN RECEPTOR POSITIVE (HCC): ICD-10-CM

## 2025-08-11 PROCEDURE — A9579 GAD-BASE MR CONTRAST NOS,1ML: HCPCS | Performed by: INTERNAL MEDICINE

## 2025-08-11 PROCEDURE — C8908 MRI W/O FOL W/CONT, BREAST,: HCPCS

## 2025-08-11 PROCEDURE — 6360000004 HC RX CONTRAST MEDICATION: Performed by: INTERNAL MEDICINE

## 2025-08-11 RX ORDER — GADOTERIDOL 279.3 MG/ML
20 INJECTION INTRAVENOUS
Status: COMPLETED | OUTPATIENT
Start: 2025-08-11 | End: 2025-08-11

## 2025-08-11 RX ADMIN — GADOTERIDOL 20 ML: 279.3 INJECTION, SOLUTION INTRAVENOUS at 09:40

## 2025-08-14 ENCOUNTER — LAB (OUTPATIENT)
Dept: LAB | Age: 75
End: 2025-08-14

## 2025-08-14 DIAGNOSIS — E21.3 HYPERPARATHYROIDISM: ICD-10-CM

## 2025-08-14 DIAGNOSIS — M85.80 OSTEOPENIA, UNSPECIFIED LOCATION: ICD-10-CM

## 2025-08-14 DIAGNOSIS — R16.0 LIVER MASS: ICD-10-CM

## 2025-08-14 DIAGNOSIS — I10 PRIMARY HYPERTENSION: ICD-10-CM

## 2025-08-14 DIAGNOSIS — E78.5 HYPERLIPIDEMIA, UNSPECIFIED HYPERLIPIDEMIA TYPE: ICD-10-CM

## 2025-08-14 LAB
ALBUMIN SERPL BCG-MCNC: 4.1 G/DL (ref 3.4–4.9)
ALP SERPL-CCNC: 140 U/L (ref 38–126)
ALT SERPL W/O P-5'-P-CCNC: 15 U/L (ref 10–35)
ANION GAP SERPL CALC-SCNC: 8 MEQ/L (ref 8–16)
AST SERPL-CCNC: 20 U/L (ref 10–35)
BASOPHILS ABSOLUTE: 0 THOU/MM3 (ref 0–0.1)
BASOPHILS NFR BLD AUTO: 0.5 %
BILIRUB SERPL-MCNC: 0.6 MG/DL (ref 0.3–1.2)
BUN SERPL-MCNC: 12 MG/DL (ref 8–23)
CALCIUM SERPL-MCNC: 10.6 MG/DL (ref 8.5–10.5)
CHLORIDE SERPL-SCNC: 101 MEQ/L (ref 98–111)
CHOLEST SERPL-MCNC: 186 MG/DL (ref 100–199)
CO2 SERPL-SCNC: 27 MEQ/L (ref 22–29)
CREAT SERPL-MCNC: 0.7 MG/DL (ref 0.5–0.9)
DEPRECATED MEAN GLUCOSE BLD GHB EST-ACNC: 117 MG/DL (ref 70–126)
DEPRECATED RDW RBC AUTO: 43.8 FL (ref 35–45)
EOSINOPHIL NFR BLD AUTO: 5.1 %
EOSINOPHILS ABSOLUTE: 0.3 THOU/MM3 (ref 0–0.4)
ERYTHROCYTE [DISTWIDTH] IN BLOOD BY AUTOMATED COUNT: 12.1 % (ref 11.5–14.5)
GFR SERPL CREATININE-BSD FRML MDRD: 89 ML/MIN/1.73M2
GLUCOSE SERPL-MCNC: 103 MG/DL (ref 74–109)
HBA1C MFR BLD HPLC: 5.9 % (ref 4–6)
HCT VFR BLD AUTO: 41.6 % (ref 37–47)
HDLC SERPL-MCNC: 51 MG/DL
HGB BLD-MCNC: 13.5 GM/DL (ref 12–16)
IMM GRANULOCYTES # BLD AUTO: 0.02 THOU/MM3 (ref 0–0.07)
IMM GRANULOCYTES NFR BLD AUTO: 0.3 %
LDLC SERPL CALC-MCNC: 103 MG/DL
LYMPHOCYTES ABSOLUTE: 1.6 THOU/MM3 (ref 1–4.8)
LYMPHOCYTES NFR BLD AUTO: 27 %
MCH RBC QN AUTO: 31.7 PG (ref 26–33)
MCHC RBC AUTO-ENTMCNC: 32.5 GM/DL (ref 32.2–35.5)
MCV RBC AUTO: 97.7 FL (ref 81–99)
MONOCYTES ABSOLUTE: 0.7 THOU/MM3 (ref 0.4–1.3)
MONOCYTES NFR BLD AUTO: 11.6 %
NEUTROPHILS ABSOLUTE: 3.3 THOU/MM3 (ref 1.8–7.7)
NEUTROPHILS NFR BLD AUTO: 55.5 %
NRBC BLD AUTO-RTO: 0 /100 WBC
PLATELET # BLD AUTO: 265 THOU/MM3 (ref 130–400)
PMV BLD AUTO: 10.1 FL (ref 9.4–12.4)
POTASSIUM SERPL-SCNC: 5.1 MEQ/L (ref 3.5–5.2)
PROT SERPL-MCNC: 6.9 G/DL (ref 6.4–8.3)
RBC # BLD AUTO: 4.26 MILL/MM3 (ref 4.2–5.4)
SODIUM SERPL-SCNC: 136 MEQ/L (ref 135–145)
T4 FREE SERPL-MCNC: 1.2 NG/DL (ref 0.92–1.68)
TRIGL SERPL-MCNC: 158 MG/DL (ref 0–199)
TSH SERPL DL<=0.05 MIU/L-ACNC: 3.31 UIU/ML (ref 0.27–4.2)
WBC # BLD AUTO: 6 THOU/MM3 (ref 4.8–10.8)

## 2025-08-22 ENCOUNTER — OFFICE VISIT (OUTPATIENT)
Dept: ONCOLOGY | Age: 75
End: 2025-08-22
Payer: COMMERCIAL

## 2025-08-22 ENCOUNTER — HOSPITAL ENCOUNTER (OUTPATIENT)
Dept: INFUSION THERAPY | Age: 75
Discharge: HOME | End: 2025-08-22
Payer: COMMERCIAL

## 2025-08-22 VITALS
HEART RATE: 68 BPM | DIASTOLIC BLOOD PRESSURE: 60 MMHG | TEMPERATURE: 98.3 F | RESPIRATION RATE: 16 BRPM | OXYGEN SATURATION: 95 % | SYSTOLIC BLOOD PRESSURE: 129 MMHG

## 2025-08-22 VITALS
HEIGHT: 65 IN | HEART RATE: 68 BPM | WEIGHT: 186.4 LBS | DIASTOLIC BLOOD PRESSURE: 60 MMHG | SYSTOLIC BLOOD PRESSURE: 129 MMHG | TEMPERATURE: 98.3 F | BODY MASS INDEX: 31.06 KG/M2 | OXYGEN SATURATION: 95 % | RESPIRATION RATE: 16 BRPM

## 2025-08-22 DIAGNOSIS — Z17.0 MALIGNANT NEOPLASM OF UPPER-OUTER QUADRANT OF RIGHT BREAST IN FEMALE, ESTROGEN RECEPTOR POSITIVE (HCC): Primary | ICD-10-CM

## 2025-08-22 DIAGNOSIS — E21.3 HYPERPARATHYROIDISM: ICD-10-CM

## 2025-08-22 DIAGNOSIS — C50.411 MALIGNANT NEOPLASM OF UPPER-OUTER QUADRANT OF RIGHT BREAST IN FEMALE, ESTROGEN RECEPTOR POSITIVE (HCC): Primary | ICD-10-CM

## 2025-08-22 DIAGNOSIS — Z79.811 USE OF ANASTROZOLE: ICD-10-CM

## 2025-08-22 PROCEDURE — 3074F SYST BP LT 130 MM HG: CPT | Performed by: INTERNAL MEDICINE

## 2025-08-22 PROCEDURE — 1123F ACP DISCUSS/DSCN MKR DOCD: CPT | Performed by: INTERNAL MEDICINE

## 2025-08-22 PROCEDURE — G2211 COMPLEX E/M VISIT ADD ON: HCPCS | Performed by: INTERNAL MEDICINE

## 2025-08-22 PROCEDURE — 3078F DIAST BP <80 MM HG: CPT | Performed by: INTERNAL MEDICINE

## 2025-08-22 PROCEDURE — 99215 OFFICE O/P EST HI 40 MIN: CPT | Performed by: INTERNAL MEDICINE

## 2025-08-22 PROCEDURE — 99211 OFF/OP EST MAY X REQ PHY/QHP: CPT

## 2025-08-22 RX ORDER — ALENDRONATE SODIUM 70 MG/1
70 TABLET ORAL
Qty: 13 TABLET | Refills: 2 | Status: SHIPPED | OUTPATIENT
Start: 2025-08-22

## (undated) DEVICE — 3M™ STERI-STRIP™ COMPOUND BENZOIN TINCTURE 40 BAGS/CARTON 4 CARTONS/CASE C1544: Brand: 3M™ STERI-STRIP™

## (undated) DEVICE — SUTURE VICRYL + SZ 3-0 L27IN ABSRB UD L26MM SH 1/2 CIR VCP416H

## (undated) DEVICE — GOWN,SIRUS,NON REINFRCD,LARGE,SET IN SL: Brand: MEDLINE

## (undated) DEVICE — PENCIL SMK EVAC ALL IN 1 DSGN ENH VISIBILITY IMPROVED AIR

## (undated) DEVICE — APPLICATOR MEDICATED 26 CC SOLUTION HI LT ORNG CHLORAPREP

## (undated) DEVICE — SUTURE VICRYL + SZ 2-0 L27IN ABSRB WHT SH 1/2 CIR TAPERCUT VCP417H

## (undated) DEVICE — SUTURE MONOCRYL SZ 4-0 L27IN ABSRB UD L19MM PS-2 1/2 CIR PRIM Y426H

## (undated) DEVICE — SPONGE LAP W18XL18IN WHT COT 4 PLY FLD STRUNG RADPQ DISP ST 2 PER PACK

## (undated) DEVICE — YANKAUER,BULB TIP,W/O VENT,RIGID,STERILE: Brand: MEDLINE

## (undated) DEVICE — SPECIMEN ORIENTATION CHARMS, SIX DISTINCTLY SHAPED STERILE 10MM CHARMS: Brand: MARGINMAP

## (undated) DEVICE — GLOVE ORANGE PI 7   MSG9070

## (undated) DEVICE — APPLIER LIG CLP M L11IN TI STR RNG HNDL FOR 20 CLP DISP

## (undated) DEVICE — SHEET, T, LAPAROTOMY, STERILE: Brand: MEDLINE

## (undated) DEVICE — Device